# Patient Record
Sex: FEMALE | Race: BLACK OR AFRICAN AMERICAN
[De-identification: names, ages, dates, MRNs, and addresses within clinical notes are randomized per-mention and may not be internally consistent; named-entity substitution may affect disease eponyms.]

---

## 2018-08-05 ENCOUNTER — HOSPITAL ENCOUNTER (INPATIENT)
Dept: HOSPITAL 68 - ERH | Age: 83
LOS: 3 days | DRG: 812 | End: 2018-08-08
Attending: INTERNAL MEDICINE
Payer: COMMERCIAL

## 2018-08-05 VITALS — SYSTOLIC BLOOD PRESSURE: 120 MMHG | DIASTOLIC BLOOD PRESSURE: 60 MMHG

## 2018-08-05 VITALS — BODY MASS INDEX: 20.76 KG/M2 | HEIGHT: 59 IN | WEIGHT: 103 LBS

## 2018-08-05 DIAGNOSIS — E78.5: ICD-10-CM

## 2018-08-05 DIAGNOSIS — M81.0: ICD-10-CM

## 2018-08-05 DIAGNOSIS — R63.4: ICD-10-CM

## 2018-08-05 DIAGNOSIS — K22.4: ICD-10-CM

## 2018-08-05 DIAGNOSIS — R26.89: ICD-10-CM

## 2018-08-05 DIAGNOSIS — I10: ICD-10-CM

## 2018-08-05 DIAGNOSIS — D46.9: Primary | ICD-10-CM

## 2018-08-05 DIAGNOSIS — K21.9: ICD-10-CM

## 2018-08-05 DIAGNOSIS — K64.4: ICD-10-CM

## 2018-08-05 DIAGNOSIS — K58.2: ICD-10-CM

## 2018-08-05 DIAGNOSIS — I87.2: ICD-10-CM

## 2018-08-05 DIAGNOSIS — G62.9: ICD-10-CM

## 2018-08-05 DIAGNOSIS — K64.9: ICD-10-CM

## 2018-08-05 DIAGNOSIS — D63.8: ICD-10-CM

## 2018-08-05 DIAGNOSIS — E55.9: ICD-10-CM

## 2018-08-05 LAB
APTT BLD: 29 SEC (ref 25–37)
ERYTHROCYTE [DISTWIDTH] IN BLOOD BY AUTOMATED COUNT: 25.2 % (ref 11.5–14.5)
HCT VFR BLD CALC: 20.4 % (ref 37–47)
MCH RBC QN AUTO: 35 PG (ref 27–31)
MCHC RBC AUTO-ENTMCNC: 33.5 G/DL (ref 33–37)
MCV RBC AUTO: 104.5 FL (ref 81–99)
PLATELET # BLD: 240 /CUMM (ref 130–400)
PMV BLD AUTO: 9.3 FL (ref 7.4–10.4)
PROTHROMBIN TIME: 13.2 SEC (ref 9.4–12.5)
RED BLOOD CELL CT: 1.95 /CUMM (ref 4.2–5.4)
WBC # BLD AUTO: 2.2 /CUMM (ref 4.8–10.8)

## 2018-08-05 PROCEDURE — 2NASP: CPT

## 2018-08-05 PROCEDURE — P9016 RBC LEUKOCYTES REDUCED: HCPCS

## 2018-08-05 PROCEDURE — 30233N1 TRANSFUSION OF NONAUTOLOGOUS RED BLOOD CELLS INTO PERIPHERAL VEIN, PERCUTANEOUS APPROACH: ICD-10-PCS | Performed by: INTERNAL MEDICINE

## 2018-08-05 NOTE — RADIOLOGY REPORT
EXAMINATION:
PORTABLE CHEST 1 VIEW
 
CLINICAL INFORMATION:
Short of breath.
 
COMPARISON:
07/20/2017.
 
TECHNIQUE:
Portable frontal view of the chest was obtained.
 
FINDINGS:
Lungs are hyperinflated with mild chronic appearing reticular markings again
seen bilaterally. I do not appreciate any superimposed focal infiltrate,
effusion, edema, or pneumothorax. Cardiac silhouette remains prominent but
unchanged. Vascular calcification seen in the aorta. No acute bony
abnormality.
 
IMPRESSION:
Hyperinflated with chronic appearing reticular markings similar to the prior
study.

## 2018-08-05 NOTE — HISTORY & PHYSICAL
Farnaz LEMUS,Vadim 08/05/18 0354:
General Information and HPI
MD Statement:
I have seen and personally examined EVA GARZA and documented this H&P.
 
The patient is a 83 year old F who presented with a patient stated chief 
complaint of [abnormal labs].
 
Source of Information: patient, family, old records
Exam Limitations: language barrier
History of Present Illness:
Patient is an 83-year-old female with past medical history of anemia, 
hyperlipidemia, GERD, constipation, internal hemorrhoids, osteoporosis 
presenting this admission for evaluation of anemia after being sent in by her 
primary care physician.
 
Patient does not speak English and her daughter was present at the time of this 
interview.  Patient's daughter reports that patient has had a history of anemia.
 Reports patient has been feeling more fatigued lately and has been short of 
breath and lightheaded.  Patient denies any chest pain.  Reports numbness and 
tingling in her hands and feet and feeling unsteady on her feet.  States that 
this has been ongoing for the past 3 years however daughter reports that she is 
just learning of this now.  Denies any bright red blood per rectum, melena, 
hematemesis, hematuria, epistaxis.  Denies easy bruising or bleeding.  Patient 
reports poor appetite and she feels that food gets stuck in her throat and per 
daughter has a history of gastritis.  Records reveal that patient had an EGD 
with Dr. Dillon which revealed a normal gastric mucosa and small hiatal hernia.  
Daughter notes that they have been supplementing the patient's diet with ensure 
or boost and she has been taking iron supplements over the past 3 months.  Of 
note patient has a history of internal hemorrhoids and hemorrhoidectomy in 2012.
 Daughter reports the patient has chronic constipation and has been alternating 
between multiple stool softeners.
 
Reports patient has a history of anemia since she was young.  Of note this is 
her first transfusion.  Patient's daughter states she herself also has anemia.  
Reports that she was told it was due to iron deficiency.  Patient patient's 
daughter states that her last colonoscopy was a few years ago and was reportedly
normal.
 
Patient in the ED was type and crossed and consented and given 1 PRBC 
transfusion.
 
 
Allergies/Medications
Allergies:
Coded Allergies:
NO KNOWN ALLERGIES (10/04/12)
  [facility] PER ANTIBIOTIC ORDER SHEET
 
Home Med list
Acetaminophen (Tylenol) (Unknown Strength) TABLET   (Unknown Dose) PO PRN PAIN  
(Reported)
Aspirin (Ecotrin*) 325 MG TABLET.DR   1 TAB PO DAILY HEART/BLOOD  (Reported)
Cholecalciferol (Vitamin D3) (Vitamin D) (Unknown Strength) CAPSULE   (Unknown 
Dose) PO DAILY SUPPLEMENT  (Reported)
Cyanocobalamin (Vitamin B-12) (Unknown Strength) TABLET   (Unknown Dose) PO 
DAILY SUPPLEMENT  (Reported)
Docusate Sodium 100 MG CAPSULE   1 CAP PO DAILY STOOL SOFTENER  (Reported)
Ferrous Sulfate (IRON) 325 MG (65 MG IRON) TABLET   1 TAB PO EOD SUPPLEMENT  (
Reported)
Losartan Potassium 50 MG TABLET   1 TAB PO DAILY BP  (Reported)
Polyethylene Glycol 3350 17 GRAM/DOSE POWDER   17 GM PO PRN GI  (Reported)
Sennosides (Senokot) 8.6 MG TABLET   1 TAB PO DAILY GI  (Reported)
 
 
Past History
 
Travel History
Traveled to [place] past 21 day No
 
Medical History
Neurological: NONE
EENT: NONE
Cardiovascular: hyperlipidemia
Respiratory: NONE
Gastrointestinal: GERD, CONSTIPATION
Hepatic: NONE
Renal: NONE
Musculoskeletal: OSTEOPOROSIS
Psychiatric: NONE
Endocrine: NONE
 
Surgical History
Surgical History: none
 
Review of Systems
 
Review of Systems
Constitutional:
Reports: chills, weakness. 
EENTM:
Reports: no symptoms. 
Cardiovascular:
Reports: no symptoms. 
Respiratory:
Reports: short of breath. 
GI:
Reports: bloating, constipation.  Denies: abdominal pain, diarrhea, melena, 
nausea, bloody stool, vomiting. 
Genitourinary:
Reports: no symptoms. 
Musculoskeletal:
Reports: back pain. 
Skin:
Reports: no symptoms. 
Neurological/Psychological:
Reports: numbness, paresthesia, tingling. 
Hematologic/Endocrine:
Reports: no symptoms. 
Immunologic/Allergic:
Reports: no symptoms. 
 
Exam & Diagnostic Data
Last 24 Hrs of Vital Signs/I&O
 Vital Signs
 
 
Date Time Temp Pulse Resp B/P B/P Pulse O2 O2 Flow FiO2
 
     Mean Ox Delivery Rate 
 
08/05 1817 98.8        
 
08/05 1757 99.0 65 16 125/59  99 Room Air  
 
08/05 1726 97.9 65 16 132/63  98 Room Air  
 
08/05 1404 98.2 75 18 135/67  98 Room Air  
 
 
 Intake & Output
 
 
 08/05 1600 08/05 0800 08/05 0000
 
Intake Total   
 
Output Total   
 
Balance   
 
    
 
Patient 103 lb  
 
Weight   
 
Weight Reported by Patient  
 
Measurement   
 
Method   
 
 
 
 
Physical Exam
General Appearance Alert, Oriented X3, Cooperative, No Acute Distress
Skin No Rashes, No Breakdown, No Significant Lesion
Skin Temp/Moisture Exam: Warm/Dry
Sepsis Skin Exam (color): Normal for Ethnicity
HEENT Atraumatic, PERRLA, EOMI, Mucous Membr. moist/pink, +conjunctival pallor
Cardiovascular Regular Rate, Normal S1, Normal S2
Lungs Clear to Auscultation, Normal Air Movement
Abdomen Normal Bowel Sounds, Soft, No Tenderness
Neurological Normal Speech, Strength at 5/5 X4 Ext, Normal Tone, Sensation 
Intact, Cranial Nerves 3-12 NL
Extremities No Clubbing, No Cyanosis, No Edema, Normal Pulses, No Tenderness/
Swelling
Vascular Normal Pulses, Pulses Symmetrical
Last 24 Hrs of Labs/Amandeep:
 Laboratory Tests
 
08/05/18 1411:
Anion Gap 6, Estimated GFR > 60, BUN/Creatinine Ratio 18.0, Glucose 88, Calcium 
9.3, Total Bilirubin 1.2, AST 25, ALT 30, Alkaline Phosphatase 29, Troponin I < 
0.01, Total Protein 6.8, Albumin 4.2, Globulin 2.6, Albumin/Globulin Ratio 1.6, 
PT 13.2  H, INR 1.21  H, APTT 29, CBC w Diff MAN DIFF ORDERED, RBC 1.95  L, MCV 
104.5  H, MCH 35.0  H, MCHC 33.5, RDW 25.2  H, MPV 9.3, Segmented Neutrophils 46
, Band Neutrophils 2, Lymphocytes 40, Monocytes 7, Eosinophils 3, Basophils 2, 
Platelet Estimate VERIFIED BY SMEAR, Polychromasia 1+, Hypochromic-Microcytic 2+
, Poikilocytosis 1+, Basophilic Stippling 1+, Anisocytosis 2+, Macrocytic Cells 
1+, Ovalocytes 1+
 
 
Assessment/Plan
Assessment:
Patient is an 83-year-old female with past medical history significant for 
anemia and internal hemorrhoids presenting this admission with abnormal lab 
values significant for anemia.  The etiology is unclear at this time however may
be multifactorial and may include iron deficiency anemia secondary to GI 
bleeding, malabsorption, nutritional deficiency.  Patient's labs reveal 
macrocytic anemia which is indicative of possible folic acid or B12 deficiency. 
Of note patient does have some neurological symptoms including paresthesias and 
gait instability.  Further evaluation and treatment is warranted at this time.  
Patient will be admitted to the general medicine floor.
 
Plan:
Admit to general med
Vitals every shift
1 PRBC transfusion
Monitor H&H with posttransfusion CBC today and CBC in a.m.
Guaiac all stools
Iron panel
B12 and folate
Peripheral smear
Consider GI consult in a.m.
Nutrition consult
 
Code: Full code
Diet: Regular 
DVT: Mechanical only in setting of severe anemia 
 
As Ranked By This Provider
Problem List:
 1. Anemia
   Qualifiers
 Anemia type: unspecified type Qualified Code: D64.9 - Anemia, unspecified
 
 
Core Measures/Misc (9/17)
 
Acute Coronary Syndrome
ACS Diagnosis: No
 
Congestive Heart Failure
Congestive Heart Failure Diagnosis No
 
Cerebrovascular Accident
CVA/TIA Diagnosis: No
 
VTE (View Protocol)
VTE Risk Factors Age>40
No Mechanical VTE Prophylaxis d/t N/A MechProphylax Ordered
No VTE Pharm Prophylaxis d/t Other (Severe Anemia)
 
Sepsis (View protocol)
Sepsis Present: No
If YES complete Sepsis Event Note If YES complete Sepsis Event Note
 
 
Forrest LEMUS,[facility] 08/06/18 1023:
Core Measures/Misc (9/17)
 
Sepsis (View protocol)
If YES complete Sepsis Event Note If YES complete Sepsis Event Note
 
Attending MD Review Statement
 
Attending Statement
Attending MD Statement: examined this patient, discuss w/resident/PA/NP, agreed 
w/resident/PA/NP, discussed with family, reviewed EMR data (avail), discussed 
with nursing, discussed with case mgmt, reviewed images, amended to note
Attending Assessment/Plan:
Pt with Esophageal dysmotility Osteoporosis s/p boniva for more than 5 yrs now 
on vit d and calcium and wt bearing activity,  Gastroesophageal reflux disease 
without esophagitis, Hyperlipidemia, unspecified on diet control does not want 
statin, Irritable bowel syndrome with both constipation and diarrhea, Vitamin D 
deficiency, Essential hypertension, Cervical arthritis, Hyperlipidemia, 
unspecified on diet control does not want statin, Irritable bowel syndrome with 
both constipation and diarrhea, Lung nodule less than 5 mm non smoker hence prob
benign pt wishes clinical follow up, Venous insufficiency (chronic) (peripheral)
rt more than left, Hemorrhoids, Primary osteoarthritis of both hips with chronic
anemia now comes with 
 Sig symptomatic anemia
Wt loss chronic with ibs features
Chronic constipation now had no bm for 5 day, hemerroids
Fatigue
Gait imbalance with peripheral neuropathy features with no sig vit def
Previous lung nodule and pelvic gonadal vein congestion  (will consider ct abd)
 
REC
GI eval and Heme eval Please call Dr. Hussein
Rectal exam
Dulcolax supp, miralax, today and if no bm can consider one dose of lactulose
Head ct with recent gait imbalance etc
Ct abd and pelvis with ivc to eval for any malignancy
Will follow

## 2018-08-05 NOTE — ED GENERAL ADULT
History of Present Illness
 
General
Chief Complaint: General Adult
Stated Complaint: SENT BY  FOR ABNORMAL LABS
Source: patient, old records, covering MD
Exam Limitations: no limitations
 
Vital Signs & Intake/Output
Vital Signs & Intake/Output
 Vital Signs
 
 
Date Time Temp Pulse Resp B/P B/P Pulse O2 O2 Flow FiO2
 
     Mean Ox Delivery Rate 
 
08/08 1431 97.5 68 18 150/60  98 Room Air  
 
 
 ED Intake and Output
 
 
 08/09 0000 08/08 1200
 
Intake Total  10
 
Output Total  
 
Balance  10
 
   
 
Intake, IV  10
 
Intake, Oral  0
 
Patient  103 lb
 
Weight  
 
 
 
Allergies
Coded Allergies:
NO KNOWN ALLERGIES (10/04/12)
  [facility] PER ANTIBIOTIC ORDER SHEET
 
Reconcile Medications
Acetaminophen (Tylenol) (Unknown Strength) TABLET   650 PO Q6 PRN PAIN  (
Reported)
Aspirin (Ecotrin*) 325 MG TABLET.DR   1 TAB PO DAILY HEART/BLOOD  (Reported)
Docusate Sodium 100 MG CAPSULE   1 CAP PO DAILY STOOL SOFTENER  (Reported)
Ferrous Sulfate (IRON) 325 MG (65 MG IRON) TABLET   1 TAB PO EOD SUPPLEMENT  (
Reported)
Losartan Potassium 50 MG TABLET   1 TAB PO DAILY BP  (Reported)
Polyethylene Glycol 3350 17 GRAM/DOSE POWDER   17 GM PO PRN GI  (Reported)
Sennosides (Senokot) 8.6 MG TABLET   1 TAB PO DAILY GI  (Reported)
 
Triage Note:
84 YO FEMALE SENT TO ER BY  FOR ABNORMAL LABS,
PT AND DAUGHTER UNSURE OF WHAT LABS WERE ABNORMAL.
STATES HX OF ANEMIA. PT C/O WEAKESS/LIGHTHEADED.
PT DENIES CHETS PAIN. PT +SOB, PER DAUGHTER "THATS
NORMAL FOR HER"
Triage Nurses Notes Reviewed? yes
Onset: Gradual
Duration: day(s):, continues in ED, getting worse, waxing and waning
Severity: severe
HPI:
Patient presents for evaluation of a severe anemia.  Patient states that she has
been suffering from weakness and fatigue recently and blood tests done by her 
physician showed a low blood cell count.  In addition to the weakness patient 
has experienced shortness of breath but this isn't unusual for her.
 
Past History
 
Travel History
Traveled to [place] past 21 day No
 
Medical History
Any Pertinent Medical History? see below for history
Neurological: NONE
EENT: NONE
Cardiovascular: hyperlipidemia
Respiratory: NONE
Gastrointestinal: GERD, CONSTIPATION
Hepatic: NONE
Renal: NONE
Musculoskeletal: OSTEOPOROSIS
Psychiatric: NONE
Endocrine: NONE
 
Surgical History
Surgical History: none
 
Psychosocial History
What is your primary language Klve[place] Navarrete
Tobacco Use: Never used
 
Family History
Hx Contributory? No
 
Review of Systems
 
Review of Systems
Constitutional:
Reports: no symptoms. 
EENTM:
Reports: no symptoms. 
Respiratory:
Reports: no symptoms. 
Cardiovascular:
Reports: no symptoms. 
GI:
Reports: no symptoms. 
Genitourinary:
Reports: no symptoms. 
Musculoskeletal:
Reports: no symptoms. 
Skin:
Reports: no symptoms. 
Neurological/Psychological:
Reports: no symptoms. 
Hematologic/Endocrine:
Reports: no symptoms. 
Immunologic/Allergic:
Reports: no symptoms. 
All Other Systems: Reviewed and Negative
 
Physical Exam
 
Physical Exam
General Appearance: SEE BELOW
Comments:
Gen.: Well-nourished, well-developed, no acute respiratory distress.  Thin.
Head: Normocephalic, atraumatic.
Eyes: Normal inspection bilaterally
Ears: Normal inspection bilaterally
Nose: Normal inspection
Throat/mouth : Moist mucosa 
Neck: Supple, full range of motion, no goiter
Heart: Regular rate and rhythm, no murmurs rubs or gallops
Lungs: Clear to auscultation bilaterally with normal air entry
Chest: Nontender
Back: Normal range of motion
Abdomen: Soft, nontender, nondistended, normal bowel sounds
Extremities: Normal range of motion grossly, equal radial pulses, no cyanosis 
clubbing or edema
Neurologic: Cranial nerves grossly intact, speech is clear
Skin: warm and dry
Psychiatric: Calm, cooperative, no apparent delusions or hallucinations
Rectal examination: Deferred given the patient's recent GI evaluation
 
Core Measures
ACS in differential dx? No
CVA/TIA Diagnosis: No
Sepsis Present: No
Sepsis Focused Exam Completed? No
 
Progress
Differential Diagnoses
I considered the following diagnoses in my evaluation of the patient: GI 
bleeding, dietary deficiency such as iron deficiency or B vitamin deficiency, 
hematologic disease, renal disease
 
Plan of Care:
 Orders
 
 
Procedure Date/time Status
 
Regular Diet 08/08 D Active
 
PT Evaluate & Treat 08/08  UNK Active
 
Discharge Patient 08/08  UNK Active
 
 
 Laboratory Tests
 
 
08/08/18 1246:
Flow Cytometry Specimen Pending
 
08/08/18 1246:
Leukemic Chromosome Anal Pending, Flow Cytometry Specimen Pending
 
Initial ED EKG: none, NSR, rate (70), NO ACUTE st SEGMENT CHANGES
Prior EKG: unchanged
Comments:
Patient's case discussed with Dr. Melendez covering for Dr. Clayton.
 
Departure
 
Departure
Disposition: STILL A PATIENT
Condition: Stable
Clinical Impression
Primary Impression: Anemia
Qualifiers:  Anemia type: unspecified type Qualified Code: D64.9 - Anemia, 
unspecified
Referrals:
Forrest LEMUS,Zak CENTENO (PCP/Family)
 
Departure Forms:
Customer Survey
General Discharge Information
 
Admission Note
Spoke With:
Nora LEMUS,Bud DAO
Documentation of Exam:
Documentation of any treatments & extenuating circumstances including Concerns 
Regarding Discharge (functional status, medication knowledge or non-compliance, 
living conditions, etc.) that warrant an admission rather than observation: 
Patient has an acute anemia of unclear cause.  She now requires a blood 
transfusion to prevent onset of symptoms such as chest pain shortness of breath 
easy fatigability and generalized weakness.  Since the cause of this patient's 
anemia is unclear it is difficult to predict if she will get better or get worse
even after a blood transfusion.  Given her advanced age and associated medical 
comorbidities I feel outpatient treatment would be risky as this could 
exacerbate chest pain or shortness of breath given her decreased oxygen carrying
capacity.  I feel that she would have great difficulty in compliance with 
outpatient treatment and could very well return in worse clinical condition.  
During her hospitalization I feel this patient should be transfused with blood 
and have repeat H&H to assess for response.  GI consultation should be 
considered for the possibility of an occult GI bleed.  Other causes of the 
patient's anemia such as hematologic disease or dietary considerations such as 
iron deficiency should be investigated and corrected.  Given this patient's age 
her treatment and recovery will likely be prolonged.  I feel she will require a 
multiple day hospitalization.
 
 
Critical Care Note
 
Critical Care Note
Critical Care Time: 30-74 min

## 2018-08-06 VITALS — DIASTOLIC BLOOD PRESSURE: 52 MMHG | SYSTOLIC BLOOD PRESSURE: 118 MMHG

## 2018-08-06 VITALS — DIASTOLIC BLOOD PRESSURE: 76 MMHG | SYSTOLIC BLOOD PRESSURE: 126 MMHG

## 2018-08-06 VITALS — SYSTOLIC BLOOD PRESSURE: 128 MMHG | DIASTOLIC BLOOD PRESSURE: 60 MMHG

## 2018-08-06 VITALS — DIASTOLIC BLOOD PRESSURE: 60 MMHG | SYSTOLIC BLOOD PRESSURE: 100 MMHG

## 2018-08-06 LAB
ABSOLUTE GRANULOCYTE CT: 1.2 /CUMM (ref 1.4–6.5)
BASOPHILS # BLD: 0 /CUMM (ref 0–0.2)
BASOPHILS NFR BLD: 0 % (ref 0–2)
EOSINOPHIL # BLD: 0.1 /CUMM (ref 0–0.7)
EOSINOPHIL NFR BLD: 2.4 % (ref 0–5)
ERYTHROCYTE [DISTWIDTH] IN BLOOD BY AUTOMATED COUNT: 27.9 % (ref 11.5–14.5)
GRANULOCYTES NFR BLD: 43.3 % (ref 42.2–75.2)
HCT VFR BLD CALC: 26.4 % (ref 37–47)
LYMPHOCYTES # BLD: 1.2 /CUMM (ref 1.2–3.4)
MCH RBC QN AUTO: 32 PG (ref 27–31)
MCHC RBC AUTO-ENTMCNC: 33.3 G/DL (ref 33–37)
MCV RBC AUTO: 96 FL (ref 81–99)
MONOCYTES # BLD: 0.3 /CUMM (ref 0.1–0.6)
PLATELET # BLD: 190 /CUMM (ref 130–400)
PMV BLD AUTO: 8.4 FL (ref 7.4–10.4)
RED BLOOD CELL CT: 2.75 /CUMM (ref 4.2–5.4)
WBC # BLD AUTO: 2.8 /CUMM (ref 4.8–10.8)

## 2018-08-06 NOTE — CT SCAN REPORT
EXAMINATION:
CT HEAD WITHOUT AND WITH CONTRAST
 
CLINICAL INFORMATION:
Gait imbalance. History of lung mass and weight loss. Assess for mass versus
NPH versus cortical atrophy.
 
COMPARISON:
None.
 
TECHNIQUE:
Contiguous axial imaging was performed from the skull base to vertex before
and after the administration of 95 mL of Optiray 320 intravenous contrast.
 
DLP:
1094.62 mGy-cm
 
FINDINGS:
There is no evidence of acute intracranial hemorrhage or territorial
infarction. No abnormal mass effect or midline shift is seen. Gray to white
matter differentiation is well preserved. No extra-axial fluid collections
are identified. No masses or abnormal enhancement are demonstrated. There
have been bilateral lens extractions..
 
The ventricles and sulci commensurately prominent consistent with moderate
diffuse volume loss. There are areas of low attenuation in the
periventricular and subcortical white matter, consistent with chronic
microvascular ischemic changes. The osseous structures and soft tissues are
normal. The mastoid air cells and visualized portions of the paranasal
sinuses are well aerated.
 
IMPRESSION:
1. There are no acute bleeds or territorial infarcts.
 
2. There are no masses or areas of abnormal enhancement.
 
3. There are chronic microvascular ischemic changes. There is diffuse volume
loss.

## 2018-08-06 NOTE — PN- HOUSESTAFF
Garry Mckeon 18 0812:
Subjective
Follow-up For:
Normocytic normochromic anemia, Lower GI bleed secondary to hemorrhoid
Complaints: no complaints
Subjective:
Patient seen and examined on chair.  There was no overnight event.  No distress.
 She did well overnight.  There was no bowel movement.  There is no history of 
any  blood loss.  He is optimistic to go home today.  She denies weakness 
tiredness, fever chill, chest pain, shortness of breath, abdominal pain, 
diarrhea, loose motion.
 
Review of Systems
Constitutional:
Reports: see HPI. 
 
Objective
Last 24 Hrs of Vital Signs/I&O
 Vital Signs
 
 
Date Time Temp Pulse Resp B/P B/P Pulse O2 O2 Flow FiO2
 
     Mean Ox Delivery Rate 
 
 0717 98.0 58 20 128/60  99 Room Air  
 
 0252 98.1 62 20 118/52  96 Nasal 2.0L 
 
       Cannula  
 
 2201 98.0 63 18 120/60  98 Room Air  
 
/ 1817 98.8        
 
 1757 99.0 65 16 125/59  99 Room Air  
 
 1726 97.9 65 16 132/63  98 Room Air  
 
 1404 98.2 75 18 135/67  98 Room Air  
 
 
 Intake & Output
 
 
  1600 /06 0800 08/ 0000
 
Intake Total  120 600
 
Output Total 1000 300 
 
Balance -1000 -180 600
 
    
 
Intake, Oral  120 600
 
Output, Urine 1000 300 
 
Patient   103 lb
 
Weight   
 
 
 
 
Physical Exam
General Appearance: Oriented X3, Cooperative, No Acute Distress
 
Assessment/Plan
Assessment:
83-year-old lady with past medical history of esophageal dysmotility, 
osteoporosis status post Boniva for more than 5 years, GERD, hyperlipidemia, 
irritable bowel syndrome with both constipation and diarrhea, cervical arthritis
, vitamin D deficiency, essential hypertension, 5 mm lung nodules, external 
hemorrhoid, primary osteoarthritis of both hips, anemia of chronic disease 
presented to the emergency department with complaint of weakness and tiredness. 
On examination she was pallor, [place] and [place] nodules on his both hands 
fingers.  Her labs are significant for low H&H at the time of presentation at 
was 6.8/20.4 up to 2 blood transfusion his Hb is 8.8, decreased WBC count, INR 
is normal, saturation is normal on 2 L of oxygen, B12 and folate level is normal
.vitally stable.
 
Problems list:
-Normocytic normochromic anemia now
-External hemorrhoids
-Peripheral neuropathy irritable bowel syndrome with constipation
-Hemotological malignancy
 
Plan:
 
-She currently has normochromic normocytic anemia.  But patient is taking B12 
and folate and she also having peripheral neuropathy  this reflects she might 
had macrocytic anemia due to B12 or folate deficiency because her MCV is on her 
upper limit of the normal. 
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  We can consider myelodysplastic syndrome and can do 
workup for  after discussing with hematologist.
-This anemia may be due to chronic disease because she having many 
comorbidities.  Her previous colonoscopy 5 year back were normal.  There was no 
focus of bleed.
-GI consult placed
-On on rectal exam there was no blood and there was no stool, there was external
hemorrhoid but there was no focus of active bleed.
-Hematology consultation placed.  Waiting for consultant.
-Head CT ordered
-CT abdomen and pelvis IVC for evaluation of any malignancy ordered
-GI/DVT prophylaxis
-Advance diet
-Patient conseled.
-Case discussed with hematologist he assured that he will see patient tomorrow 
and he think that he would work for myelodysplastic syndrome.
Problem List:
 1. Dizziness
 
 2. Anemia
 
Pain Ratin
Pain Location:
N/A
Pain Goal: Remain pain free
Pain Plan:
N/A
Tomorrow's Labs & Rationales:
LOGAN Clayton MD,[facility] 18 1033:
Attending MD Review Statement
 
Attending Statement
Attending MD Statement: examined this patient, discuss w/resident/PA/NP, agreed 
w/resident/PA/NP, discussed with family, reviewed EMR data (avail), discussed 
with nursing, discussed with case mgmt, reviewed images, amended to note
Attending Assessment/Plan:
Full note per house officer
General Appearance Alert, Oriented X3, Cooperative, No Acute Distress
Skin No Rashes, No Breakdown, No Significant Lesion
Skin Temp/Moisture Exam: Warm/Dry
Sepsis Skin Exam (color): Normal for Ethnicity
HEENT Atraumatic, PERRLA, EOMI, Mucous Membr. moist/pink, +conjunctival pallor
Cardiovascular Regular Rate, Normal S1, Normal S2
Lungs Clear to Auscultation, Normal Air Movement
Abdomen Normal Bowel Sounds, Soft, No Tenderness
Neurological Normal Speech, Strength at 5/5 X4 Ext, Normal Tone, Sensation 
Intact, Cranial Nerves 3-12 NL
Extremities No Clubbing, No Cyanosis, No Edema, Normal Pulses, No Tenderness/
Swelling
Vascular Normal Pulses, Pulses Symmetrical
 
Pt with Esophageal dysmotility Osteoporosis s/p boniva for more than 5 yrs now 
on vit d and calcium and wt bearing activity,  Gastroesophageal reflux disease 
without esophagitis, Hyperlipidemia, unspecified on diet control does not want 
statin, Irritable bowel syndrome with both constipation and diarrhea, Vitamin D 
deficiency, Essential hypertension, Cervical arthritis, Hyperlipidemia, 
unspecified on diet control does not want statin, Irritable bowel syndrome with 
both constipation and diarrhea, Lung nodule less than 5 mm non smoker hence prob
benign pt wishes clinical follow up, Venous insufficiency (chronic) (peripheral)
rt more than left, Hemorrhoids, Primary osteoarthritis of both hips with chronic
anemia now comes with 
 Sig symptomatic anemia
Wt loss chronic with ibs features
Chronic constipation now had no bm for 5 day, hemerroids
Fatigue
Gait imbalance with peripheral neuropathy features with no sig vit def
Previous lung nodule and pelvic gonadal vein congestion  (will consider ct abd)
 
REC
GI eval and Heme eval Please call Dr. Hussein
Rectal exam
Dulcolax supp, miralax, today and if no bm can consider one dose of lactulose
Head ct with recent gait imbalance etc
Ct abd and pelvis with ivc to eval for any malignancy
Will follow

## 2018-08-06 NOTE — CONS- GASTROENTEROLOGY
General Information and HPI
 
Consulting Request
Date of Consult: 08/06/18
Requested By:
Bud Melendez MD
 
Reason for Consult:
Anemia. Constipation. Dysphagia.
Source of Information: patient
Exam Limitations: language barrier
History of Present Illness:
Ms. Ramirez is an 83 year old female with a PMH significant for hyperlipidemia 
and osteoporosis who presented to  yesterday after being sent in by her PCP 
for a transfusion after being noted to have a hgb of about 6.8 on routine blood 
work.  Her daughter is at her bedside who was able to help with her history.  
She is without any GI complatins.  She denies any abdominal pain with eating, 
heartburn or current dysphagia.  She is also without any vomiting or 
hematemesis. She is constipated at baseline and this hasn't changed recently.  
She has been without any brbpr, melena or diarrhea.  In the ER she was 
hemodynamically stable and afebrile.  She was noted to have a macrocytic anemia 
for which she was tranfused with 2 units of PRBCs with approprite correction of 
her hgb and she had a ct scan of her abdomen that was negative for any obvious 
masses or a retroperitoneal hematoma. Since admission she has been 
hemodynamically stable and without overt GI bleeding. 
 
Allergies/Medications
Allergies:
Coded Allergies:
NO KNOWN ALLERGIES (10/04/12)
  [facility] PER ANTIBIOTIC ORDER SHEET
 
Home Med List:
Acetaminophen (Tylenol) (Unknown Strength) TABLET   650 PO Q6 PRN PAIN  (
Reported)
Aspirin (Ecotrin*) 325 MG TABLET.DR   1 TAB PO DAILY HEART/BLOOD  (Reported)
Docusate Sodium 100 MG CAPSULE   1 CAP PO DAILY STOOL SOFTENER  (Reported)
Ferrous Sulfate (IRON) 325 MG (65 MG IRON) TABLET   1 TAB PO EOD SUPPLEMENT  (
Reported)
Losartan Potassium 50 MG TABLET   1 TAB PO DAILY BP  (Reported)
Polyethylene Glycol 3350 17 GRAM/DOSE POWDER   17 GM PO PRN GI  (Reported)
Sennosides (Senokot) 8.6 MG TABLET   1 TAB PO DAILY GI  (Reported)
 
Current Medications:
 Current Medications
 
 
  Sig/Erin Start time  Last
 
Medication Dose Route Stop Time Status Admin
 
Acetaminophen 650 MG Q6P PRN 08/05 1930 AC 
 
  PO   
 
Acetaminophen 700 MG Q6P PRN 08/05 1930 AC 
 
  IV   
 
Bisacodyl 10 MG ONCE ONE 08/06 1215 DC 08/06
 
  CT 08/06 1216  1302
 
Patient Medication  1 ED ONE ONE 08/06 1200 DC 08/06
 
Teaching  ED 08/06 1201  1204
 
Polyethylene Glycol 17 GM DAILY 08/06 1208 AC 
 
  PO   
 
 
 
 
Past History
 
Travel History
Traveled to [place] past 21 day No
 
Medical History
Blood Transfusion Hx: No
Neurological: NONE
EENT: NONE
Cardiovascular: hyperlipidemia
Respiratory: NONE
Gastrointestinal: GERD, CONSTIPATION
Hepatic: NONE
Renal: NONE
Musculoskeletal: OSTEOPOROSIS
Psychiatric: NONE
Endocrine: NONE
Blood Disorders: anemia
Cancer(s): NONE
GYN/Reproductive: NONE
 
Surgical History
Surgical History: 1
 
Psychosocial History
Where Do You Live? Home
Services at Home: None
Smoking Status: Never Smoked
 
Review of Systems
Review of Systems:
Secondary to a lagnuage barrier a full 12 point review of systems was not 
obtained.
 
Exam & Diagnostic Data
Vital Signs and I&O
Vital Signs
 
 
Date Time Temp Pulse Resp B/P B/P Pulse O2 O2 Flow FiO2
 
     Mean Ox Delivery Rate 
 
08/06 0717 98.0 58 20 128/60  99 Room Air  
 
08/06 0252 98.1 62 20 118/52  96 Nasal 2.0L 
 
       Cannula  
 
08/05 2201 98.0 63 18 120/60  98 Room Air  
 
08/05 1817 98.8        
 
08/05 1757 99.0 65 16 125/59  99 Room Air  
 
08/05 1726 97.9 65 16 132/63  98 Room Air  
 
08/05 1404 98.2 75 18 135/67  98 Room Air  
 
 
 Intake & Output
 
 
 08/06 1600 08/06 0400 08/05 1600 08/05 0400 08/04 1600 08/04 0400
 
Intake Total 120 600    
 
Output Total 1300     
 
Balance -1180 600    
 
       
 
Intake, Oral 120 600    
 
Output, Urine 1300     
 
Patient  103 lb 103 lb   
 
Weight      
 
Weight   Reported by Patient   
 
Measurement      
 
Method      
 
 
 
 
Physical Exam
General Appearance: no apparent distress, alert, awake, comfortable, thin
Head: atraumatic, normal appearance
Eyes:
Bilateral: normal appearance. 
Ears, Nose, Throat: normal pharynx, normal ENT inspection
Neck: normal inspection, supple, full range of motion
Respiratory: normal breath sounds, chest non-tender
Cardiovascular: regular rate/rhythm
Gastrointestinal: normal bowel sounds, soft, non-tender, no organomegaly
Rectal: deferred
Back: normal inspection
Extremities: normal inspection, normal capillary refill, normal range of motion
Neurologic/Psych: no motor/sensory deficits, awake, alert, oriented x 3
Skin: intact, normal color, warm/dry
 
Results
Pertinent Lab Results:
 Laboratory Tests
 
 
 08/06 08/06
 
 0600 0325
 
Chemistry  
 
  Sodium (137 - 145 mmol/L) Cancelled 136  L
 
  Potassium (3.5 - 5.1 mmol/L) Cancelled 4.4
 
  Chloride (98 - 107 mmol/L) Cancelled 104
 
  Carbon Dioxide (22 - 30 mmol/L) Cancelled 30
 
  Anion Gap (5 - 16) Cancelled 2  L
 
  BUN (7 - 17 mg/dL) Cancelled 11
 
  Creatinine (0.5 - 1.0 mg/dL) Cancelled 0.5
 
  Estimated GFR (>60 ml/min)  > 60
 
  BUN/Creatinine Ratio (7 - 25 %) Cancelled 22.0
 
Hematology  
 
  CBC w Diff Cancelled MAN DIFF ORDERED
 
  WBC (4.8 - 10.8 /CUMM) Cancelled 2.8  L
 
  RBC (4.20 - 5.40 /CUMM) Cancelled 2.75  L
 
  Hgb (12.0 - 16.0 G/DL) Cancelled 8.8  L
 
  Hct (37 - 47 %) Cancelled 26.4  L
 
  MCV (81.0 - 99.0 FL) Cancelled 96.0
 
  MCH (27.0 - 31.0 PG) Cancelled 32.0  H
 
  MCHC (33.0 - 37.0 G/DL) Cancelled 33.3
 
  RDW (11.5 - 14.5 %) Cancelled 27.9  H
 
  Plt Count (130 - 400 /CUMM) Cancelled 190
 
  MPV (7.4 - 10.4 FL) Cancelled 8.4
 
  Gran % (42.2 - 75.2 %)  43.3
 
  Lymphocytes % (20.5 - 51.1 %)  44.5
 
  Monocytes % (1.7 - 9.3 %)  9.8  H
 
  Eosinophils % (0 - 5 %)  2.4
 
  Basophils % (0.0 - 2.0 %)  0
 
  Absolute Granulocytes (1.4 - 6.5 /CUMM)  1.2  L
 
  Absolute Lymphocytes (1.2 - 3.4 /CUMM)  1.2
 
  Absolute Monocytes (0.10 - 0.60 /CUMM)  0.3
 
  Absolute Eosinophils (0.0 - 0.7 /CUMM)  0.1
 
  Absolute Basophils (0.0 - 0.2 /CUMM)  0
 
  Platelet Estimate (ADEQUATE)  ADEQUATE
 
  Polychromasia  1+
 
  Hypochromic-Microcytic  1+
 
  Anisocytosis  1+
 
 
 
 
 08/06 08/05 08/05
 
 0000 2355 1411
 
Chemistry   
 
  Sodium (137 - 145 mmol/L)   138
 
  Potassium (3.5 - 5.1 mmol/L)   4.1
 
  Chloride (98 - 107 mmol/L)   103
 
  Carbon Dioxide (22 - 30 mmol/L)   29
 
  Anion Gap (5 - 16)   6
 
  BUN (7 - 17 mg/dL)   9
 
  Creatinine (0.5 - 1.0 mg/dL)   0.5
 
  Estimated GFR (>60 ml/min)   > 60
 
  BUN/Creatinine Ratio (7 - 25 %)   18.0
 
  Glucose (65 - 99 mg/dL)   88
 
  Calcium (8.4 - 10.2 mg/dL)   9.3
 
  Iron (37 - 170 ug/dL)   266  H
 
  TIBC (265 - 497 ug/dL)   301
 
  Ferritin (11.1 - 264 ng/mL)   167.0
 
  Total Bilirubin (0.2 - 1.3 mg/dL)   1.2
 
  AST (14 - 36 U/L)   25
 
  ALT (9 - 52 U/L)   30
 
  Alkaline Phosphatase (<127 U/L)   29
 
  Lactate Dehydrogenase (313 - 618 U/L)   563
 
  Troponin I (< 0.11 ng/ml)   < 0.01
 
  Total Protein (6.3 - 8.2 g/dL)   6.8
 
  Albumin (3.5 - 5.0 g/dL)   4.2
 
  Globulin (1.9 - 4.2 gm/dL)   2.6
 
  Albumin/Globulin Ratio (1.1 - 2.2 %)   1.6
 
  Vitamin B12 (239 - 931 pg/mL)   > 1000  H
 
  Folate (2.76 - 20.0 ng/mL)   > 20.0  H
 
Coagulation   
 
  PT (9.4 - 12.5 SEC)   13.2  H
 
  INR (0.90 - 1.19)   1.21  H
 
  APTT (25 - 37 SEC)   29
 
Hematology   
 
  CBC w Diff Cancelled Cancelled MAN DIFF ORDERED
 
  WBC (4.8 - 10.8 /CUMM) Cancelled Cancelled 2.2  L
 
  RBC (4.20 - 5.40 /CUMM) Cancelled Cancelled 1.95  L
 
  Hgb (12.0 - 16.0 G/DL) Cancelled Cancelled 6.8 *L
 
  Hct (37 - 47 %) Cancelled Cancelled 20.4  L
 
  MCV (81.0 - 99.0 FL) Cancelled Cancelled 104.5  H
 
  MCH (27.0 - 31.0 PG) Cancelled Cancelled 35.0  H
 
  MCHC (33.0 - 37.0 G/DL) Cancelled Cancelled 33.5
 
  RDW (11.5 - 14.5 %) Cancelled Cancelled 25.2  H
 
  Plt Count (130 - 400 /CUMM) Cancelled Cancelled 240
 
  MPV (7.4 - 10.4 FL) Cancelled Cancelled 9.3
 
  Segmented Neutrophils (42.2 - 75.2 %)   46
 
  Band Neutrophils (0.0 - 5.0 %)   2
 
  Lymphocytes (20.5 - 51.1 %)   40
 
  Monocytes (1.7 - 9.3 %)   7
 
  Eosinophils (0 - 5.0 %)   3
 
  Basophils (0.0 - 2.0 %)   2
 
  Platelet Estimate (ADEQUATE)   VERIFIED BY SMEAR
 
  Polychromasia   1+
 
  Hypochromic-Microcytic   2+
 
  Poikilocytosis   1+
 
  Basophilic Stippling   1+
 
  Anisocytosis   2+
 
  Macrocytic Cells   1+
 
  Ovalocytes   1+
 
  Retic Count (0.5 - 2.0 %)   1.67
 
Miscellaneous   
 
  Ref Lab Test Result   Cancelled
 
 
 
 
 08/05
 
 1404
 
Hematology 
 
  Haptoglobin Pending
 
 
 
Imaging/Other Studies:
DATE OF SERVICE:  01/22/2013
 
PROCEDURE:  Upper endoscopy with biopsies.
 
: Dr. Reddy Dillon.
 
ASA:  Class II.
 
INDICATIONS:  Dysphagia.
 
MEDICATIONS RECEIVED: As per anesthesiologist.
 
PATIENT TOLERANCE:  Good.
 
COMPLICATIONS:  None.
 
EXTENT REAWCHED:  Second portion of the duodenum.
 
PROCEDURE:  After getting written informed consent the patient was placed in the
left lateral decubitus position with pulse oximetry, cardiac monitoring, and 
supplemental oxygen given.  A bite block was inserted and IV sedation was given 
until the desired effect was achieved.  A high definition upper Olympus 
endoscope was then inserted into the mouth and advanced to the second portion of
the duodenum with little difficulty. Retroflexed views and photo documentation 
were obtained.
 
FINDINGS:
 
Esophagus:  The esophageal mucosa was grossly normal in appearance. There was a 
normal-appearing Z-line at 36 cm from the incisors.  There were no esophageal 
strictures, erosions or masses appreciated.  Random biopsies were obtained from 
the Z-line and from the lower esophageal mucosa with the cold biopsy forceps and
sent to pathology for further evaluation.
 
Stomach:  The gastric mucosa was grossly normal in appearance.  Distension and 
peristalsis of the stomach appeared normal.  There were no ulcers, erosions or 
masses appreciated.  Retroflexed views revealed a small hiatal hernia.  Biopsies
were obtained from the antrum with the cold biopsy forceps and sent to pathology
for further evaluation.
 
Duodenum:  The duodenal bulb, sweep and folds were grossly normal in appearance.
 
IMPRESSION:
Small hiatal hernia.
Normal GE junction and esophageal mucosa, status post random biopsies.  
Normal gastric mucosa, status post biopsies.
 
SERVICE DATE: 08/06/18-
EXAM TYPE: CAT - CT ABD & PELVIS W IV CONTRAST
 
EXAMINATION:
CT ABDOMEN AND PELVIS WITH CONTRAST
 
CLINICAL INFORMATION:
Weight loss. Previous lung nodule.
 
COMPARISON:
Abdomen ultrasound, 12/12/2013. Abdomen and pelvis CT, 10/18/2013
 
TECHNIQUE:
Multidetector volumetric imaging was performed of the abdomen and pelvis
following IV administration of 95 mL of Optiray 320 intravenous contrast.
Sagittal and coronal reformatted images were obtained on the technologist's
workstation.
 
DLP:
253 mGy-cm
 
FINDINGS:
 
LUNG BASES: Cardiomegaly. Trace bilateral pleural effusions and mild
bibasilar atelectasis.
 
LIVER, GALLBLADDER, AND BILIARY TREE: Liver has normal size and contour. No
focal hepatic lesion or intrahepatic bile duct dilatation. Intrahepatic IVC
is dilated and hepatic veins are prominent, likely from elevated right-sided
cardiac pressures. The gallbladder is unremarkable.
 
PANCREAS: Unremarkable.
 
SPLEEN: Unremarkable.
 
ADRENAL GLANDS: Unremarkable.
 
KIDNEYS AND URETERS: The kidneys are normal in size, shape, and attenuation.
Small, 0.5 cm hypodense focus in the medial aspect of the left upper pole is
likely a cyst but is too small for definitive characterization. No suspicious
renal lesion, nephrolithiasis or hydronephrosis.
 
BLADDER: The urinary bladder is well distended and has normal wall thickness.
No bladder calculi.
 
GASTROINTESTINAL TRACT: Stomach is unremarkable. Bowel loops are normal in
caliber. No inflammation or obstruction along the gastrointestinal tract. No
ascites or pneumoperitoneum.
 
ABDOMINAL WALL: Unremarkable.
 
LYMPH NODES: Normal.
 
VASCULAR: Atherosclerosis of the abdominal aorta without aneurysm. Inferior
vena cava and hepatic veins are prominent, likely from elevated right-sided
cardiac pressures. Gonadal veins and parauterine veins are chronically
dilated, consistent with venous valve incompetence.
 
PELVIC VISCERA: The uterus is retroflexed. Prominent myometrial veins are
seen. Trace amount of free fluid in the pelvic cul-de-sac. No pelvic mass.
Multiple phleboliths within the lower pelvis.
 
OSSEOUS STRUCTURES: Mild dextroscoliosis of the lumbar spine. Facet
osteoarthritis of L4-L5 and L5-S1, and vacuum disc degenerative change at
L5-S1. There is 0.4 cm of grade 1 anterolisthesis of L4 on L5. No suspicious
bone lesions.
 
IMPRESSION:
- Cardiomegaly, trace bilateral pleural effusions and engorged inferior vena
cava and hepatic veins (likely from elevated right-sided cardiac pressures.
- No evidence of abdominal mass or lymphadenopathy.
- Chronically dilated gonadal and parauterine veins -- indicative of venous
valve incompetence.
 
 
Assessment/Plan
Assessment/Recommendations:
Assessment: Ms. Ramirez is an 83 year old female admitted with a hgb of 6.8 of 
uncertain etiology, but she is without overt GI bleeding and she also isn't iron
deficient or b12 or folate deficient on lab work.  She also had a ct scan of her
abdomen and pelvis which didn't reveal and bowel wall abnormalities or obvious 
masses within her bowel or stomach which makes a GI source of anemia less likely
when taken in conjunction with the lab work.  She had an endoscopy about 5 years
ago which was done for dysphagia she had at that time which only showed a hiatal
hernia and was otherwise unremarkable and while it may ultimately be necessary 
to repeat this along with repeating a colonoscopy which she apparently hasn't 
had in over 10 years I don't feel that either test needs to be pursued as an 
inpatient considering she is without any overt gi bleeding.  As she is not iron 
deficient though and is currently being evaluated by hematology it may not be 
necessary to repeat at all if her anemia is deemed to be secondary to in 
infiltrative bone marrow process.
 
Recommendations:
1.  Diet as tolerated.
2.  Follow daily CBC and if hgb remains stable would consider discharge in the 
am with plan to pursue a repeat endosocpic work up as an outpatient if the 
hematological work up is negative.
3.  Follow up haptoglobin and hematology recommendations.
4.  Notify GI for signs of overt GI bleeding.
 
As she has no acute GI issues which require inpatient work up I will sign off at
this time and ask that GI be reconsulte with any new GI issues which may arise 
on this hospitalization.
Problem List:
 1. Anemia
 
 2. Dizziness
 
Copies To:
Forrest LEMUS,Zak KEMP.
 
Consult Acknowledgment
- Thank you for your consult request.

## 2018-08-06 NOTE — CT SCAN REPORT
EXAMINATION:
CT ABDOMEN AND PELVIS WITH CONTRAST
 
CLINICAL INFORMATION:
Weight loss. Previous lung nodule.
 
COMPARISON:
Abdomen ultrasound, 12/12/2013. Abdomen and pelvis CT, 10/18/2013
 
TECHNIQUE:
Multidetector volumetric imaging was performed of the abdomen and pelvis
following IV administration of 95 mL of Optiray 320 intravenous contrast.
Sagittal and coronal reformatted images were obtained on the technologist's
workstation.
 
DLP:
253 mGy-cm
 
FINDINGS:
 
LUNG BASES: Cardiomegaly. Trace bilateral pleural effusions and mild
bibasilar atelectasis.
 
LIVER, GALLBLADDER, AND BILIARY TREE: Liver has normal size and contour. No
focal hepatic lesion or intrahepatic bile duct dilatation. Intrahepatic IVC
is dilated and hepatic veins are prominent, likely from elevated right-sided
cardiac pressures. The gallbladder is unremarkable.
 
PANCREAS: Unremarkable.
 
SPLEEN: Unremarkable.
 
ADRENAL GLANDS: Unremarkable.
 
KIDNEYS AND URETERS: The kidneys are normal in size, shape, and attenuation.
Small, 0.5 cm hypodense focus in the medial aspect of the left upper pole is
likely a cyst but is too small for definitive characterization. No suspicious
renal lesion, nephrolithiasis or hydronephrosis.
 
BLADDER: The urinary bladder is well distended and has normal wall thickness.
No bladder calculi.
 
GASTROINTESTINAL TRACT: Stomach is unremarkable. Bowel loops are normal in
caliber. No inflammation or obstruction along the gastrointestinal tract. No
ascites or pneumoperitoneum.
 
ABDOMINAL WALL: Unremarkable.
 
LYMPH NODES: Normal.
 
VASCULAR: Atherosclerosis of the abdominal aorta without aneurysm. Inferior
vena cava and hepatic veins are prominent, likely from elevated right-sided
cardiac pressures. Gonadal veins and parauterine veins are chronically
dilated, consistent with venous valve incompetence.
 
PELVIC VISCERA: The uterus is retroflexed. Prominent myometrial veins are
seen. Trace amount of free fluid in the pelvic cul-de-sac. No pelvic mass.
Multiple phleboliths within the lower pelvis.
 
OSSEOUS STRUCTURES: Mild dextroscoliosis of the lumbar spine. Facet
osteoarthritis of L4-L5 and L5-S1, and vacuum disc degenerative change at
L5-S1. There is 0.4 cm of grade 1 anterolisthesis of L4 on L5. No suspicious
bone lesions.
 
IMPRESSION:
- Cardiomegaly, trace bilateral pleural effusions and engorged inferior vena
cava and hepatic veins (likely from elevated right-sided cardiac pressures.
- No evidence of abdominal mass or lymphadenopathy.
- Chronically dilated gonadal and parauterine veins -- indicative of venous
valve incompetence.

## 2018-08-07 VITALS — SYSTOLIC BLOOD PRESSURE: 132 MMHG | DIASTOLIC BLOOD PRESSURE: 70 MMHG

## 2018-08-07 VITALS — SYSTOLIC BLOOD PRESSURE: 120 MMHG | DIASTOLIC BLOOD PRESSURE: 62 MMHG

## 2018-08-07 VITALS — DIASTOLIC BLOOD PRESSURE: 68 MMHG | SYSTOLIC BLOOD PRESSURE: 146 MMHG

## 2018-08-07 LAB
ABSOLUTE GRANULOCYTE CT: 2 /CUMM (ref 1.4–6.5)
BASOPHILS # BLD: 0 /CUMM (ref 0–0.2)
BASOPHILS NFR BLD: 0 % (ref 0–2)
EOSINOPHIL # BLD: 0.1 /CUMM (ref 0–0.7)
EOSINOPHIL NFR BLD: 3.2 % (ref 0–5)
ERYTHROCYTE [DISTWIDTH] IN BLOOD BY AUTOMATED COUNT: 26.7 % (ref 11.5–14.5)
GRANULOCYTES NFR BLD: 61.2 % (ref 42.2–75.2)
HCT VFR BLD CALC: 31.5 % (ref 37–47)
LYMPHOCYTES # BLD: 0.8 /CUMM (ref 1.2–3.4)
MCH RBC QN AUTO: 32.3 PG (ref 27–31)
MCHC RBC AUTO-ENTMCNC: 33.5 G/DL (ref 33–37)
MCV RBC AUTO: 96.3 FL (ref 81–99)
MONOCYTES # BLD: 0.3 /CUMM (ref 0.1–0.6)
PLATELET # BLD: 220 /CUMM (ref 130–400)
PMV BLD AUTO: 8.5 FL (ref 7.4–10.4)
RED BLOOD CELL CT: 3.27 /CUMM (ref 4.2–5.4)
WBC # BLD AUTO: 3.2 /CUMM (ref 4.8–10.8)

## 2018-08-07 NOTE — PN- HOUSESTAFF
Subjective
Follow-up For:
Normocytic anemia, bicytopenias, weakness.
Complaints: Weakness and somtime feel dizzy. No bowel movements since two days
Subjective:
Patient seen and examined at bed.  He is comfortable with no acute distress.  
There was no overnight event.  He did well last night.  She complaining of 
constipation, feel a little bit dizzy.  This may be due to low H&H.  She denies 
headache, history of fall, chest pain, palpitation, cough, diarrhea, burning 
micturition.
 
Review of Systems
Constitutional:
Reports: see HPI. 
 
Objective
Last 24 Hrs of Vital Signs/I&O
 Vital Signs
 
 
Date Time Temp Pulse Resp B/P B/P Pulse O2 O2 Flow FiO2
 
     Mean Ox Delivery Rate 
 
 0620 98.0 83 18 146/68  98 Room Air  
 
 0000       Room Air  
 
 2145 98.3 50 16 126/76  97 Room Air  
 
 1359 99.7 62 20 100/60  100 Room Air  
 
 
 Intake & Output
 
 
  0800 / 0000  1600
 
Intake Total 360 600 560
 
Output Total   1200
 
Balance 360 600 -640
 
    
 
Intake, IV   10
 
Intake, Oral 360 600 550
 
Number   0
 
Bowel   
 
Movements   
 
Output, Urine   1200
 
 
 
 
Physical Exam
General Appearance: Alert, Oriented X3, Cooperative, No Acute Distress
Cardiovascular: Normal S1, Normal S2
Lungs: Clear to Auscultation, Normal Air Movement
 
Assessment/Plan
Assessment:
Assessment/Plan/Problems list;
 
83-year-old lady with past medical history of esophageal dysmotility, 
osteoporosis status post [place] for more than 5 years, GERD, hyperlipidemia, 
irritable bowel syndrome with both constipation and diarrhea, cervical arthritis
, vitamin D deficiency, essential hypertension, 5 mm lung nodules, external 
hemorrhoid, primary osteoarthritis of both hips, anemia of chronic disease 
presented to the emergency department with complaint of weakness and tiredness. 
On examination she was pallor, [place] and [place] nodules on his both hands 
fingers.  Her labs are significant for low H&H at the time of presentation at 
was 6.8/20.4 up to 2 blood transfusion his Hb is 8.8, decreased WBC count, INR 
is normal, saturation is normal on 2 L of oxygen, B12 and folate level is normal
.vitally stable.
 
Problems list:
-Anemia/bicytopenia
-Decreased reticulocyte count
-External hemorrhoids
-Peripheral neuropathy irritable,
-bowel syndrome with constipation
-Hemotological malignancy?  Workup is awaited.
 
Plan:
 
-She currently has normochromic anemia.  But patient is taking B12 and folate 
and she also having peripheral neuropathy.
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  We can consider one of the differential 
myelodysplastic syndrome beacuase her her leukopenia is since long.
-Hematologist recommended bone marrow biopsy, flow cytometry for 5Q mutation,
-Patient will be NPO from midnight for anticipated bone marrow biopsy tomorrow.
-This anemia may be due to chronic disease because she having many 
comorbidities.  Her previous colonoscopy 5 year back were normal.  There was no 
focus of bleed.
-GI consult placed
-On on rectal exam there was no blood and there was no stool, there was external
hemorrhoid but there was no focus of active bleed.
-CT abdomen and pelvis IVC for evaluation of any malignancy was ordered was 
negative for any mass, or any another abnormality which can lead to bicytopenias
-GI/DVT prophylaxis
-Advance diet
-Patient conseled.
-
Problem List:
 1. Anemia
 
 2. Cytopenia
 
 3. Symptomatic anemia
 
Pain Ratin
Pain Location:
n/a
Pain Goal: Remain pain free
Pain Plan:
n/a
Tomorrow's Labs & Rationales:
n/a

## 2018-08-07 NOTE — PN- PULMONARY
Subjective
HPI/Critical Care Issues:
Patient seen and examined at bed.  He is comfortable with no acute distress.  
There was no overnight event.  He did well last night.  She complaining of 
constipation, feel a little bit dizzy.  This may be due to low H&H.  She denies 
headache, history of fall, chest pain, palpitation, cough, diarrhea, burning 
micturition.
 
Objective
Current Medications:
 Current Medications
 
 
  Sig/Erin Start time  Last
 
Medication Dose Route Stop Time Status Admin
 
Acetaminophen 650 MG Q6P PRN 08/05 1930 AC 
 
  PO   
 
Acetaminophen 700 MG Q6P PRN 08/05 1930 AC 
 
  IV   
 
Bisacodyl 10 MG ONCE ONE 08/06 1215 DC 08/06
 
  CA 08/06 1216  1302
 
Patient Medication  1 ED ONE ONE 08/06 1200 DC 08/06
 
Teaching  ED 08/06 1201  1204
 
Polyethylene Glycol 17 GM DAILY 08/06 1208 AC 08/07
 
  PO   0830
 
 
 
 
Vital Signs & I&O
Last 24 Hrs of Vitals and I&O:
 Vital Signs
 
 
Date Time Temp Pulse Resp B/P B/P Pulse O2 O2 Flow FiO2
 
     Mean Ox Delivery Rate 
 
08/07 0620 98.0 83 18 146/68  98 Room Air  
 
08/07 0000       Room Air  
 
08/06 2145 98.3 50 16 126/76  97 Room Air  
 
08/06 1359 99.7 62 20 100/60  100 Room Air  
 
 
 Intake & Output
 
 
 08/07 1600 08/07 0800 08/07 0000
 
Intake Total  360 600
 
Output Total   
 
Balance  360 600
 
    
 
Intake, Oral  360 600
 
 
 
 
Impression/Plan
 
Impression/Plan
Impression/Plan:
General Appearance Alert, Oriented X3, Cooperative, No Acute Distress
Skin No Rashes, No Breakdown, No Significant Lesion
Skin Temp/Moisture Exam: Warm/Dry
Sepsis Skin Exam (color): Normal for Ethnicity
HEENT Atraumatic, PERRLA, EOMI, Mucous Membr. moist/pink, +conjunctival pallor
Cardiovascular Regular Rate, Normal S1, Normal S2
Lungs Clear to Auscultation, Normal Air Movement
Abdomen Normal Bowel Sounds, Soft, No Tenderness
Neurological Normal Speech, Strength at 5/5 X4 Ext, Normal Tone, Sensation 
Intact, Cranial Nerves 3-12 NL
Extremities No Clubbing, No Cyanosis, No Edema, Normal Pulses, No Tenderness/
Swelling
Vascular Normal Pulses, Pulses Symmetrical
 
Pt with Esophageal dysmotility Osteoporosis s/p boniva for more than 5 yrs now 
on vit d and calcium and wt bearing activity,  Gastroesophageal reflux disease 
without esophagitis, Hyperlipidemia, unspecified on diet control does not want 
statin, Irritable bowel syndrome with both constipation and diarrhea, Vitamin D 
deficiency, Essential hypertension, Cervical arthritis, Hyperlipidemia, 
unspecified on diet control does not want statin, Irritable bowel syndrome with 
both constipation and diarrhea, Lung nodule less than 5 mm non smoker hence prob
benign pt wishes clinical follow up, Venous insufficiency (chronic) (peripheral)
rt more than left, Hemorrhoids, Primary osteoarthritis of both hips with chronic
anemia now comes with 
Sig symptomatic anemia prob due to Bone marrow dysfunction r/o mds
Wt loss chronic with ibs features
Chronic constipation now had no bm for 6 day, hemerroids
Fatigue
Gait imbalance with peripheral neuropathy features with no sig vit def
Previous lung nodule and pelvic gonadal vein congestion, Ct abd and pelvis nil 
acute
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
 
REC
GI eval and Heme eval appretiated
Agg rx of constipation, use dulcolax supp and lactulose etc
Check echo
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
If pt has bm and if stable ok to dc soon
PT eval

## 2018-08-07 NOTE — CONS- HEMATOLOGY
General Information and HPI
 
Consulting Request
Date of Consult: 08/07/18
Requested By:
Forrest LEMUS,Zak CENTENO
 
Reason for Consult:
anemia, leukopenia
Source of Information: patient, family, old records
Exam Limitations: language barrier
History of Present Illness:
Ms. Ramirez is an 83-year-old Salvadorean speaking female with osteoporosis, GERD, 
and anemia who presented to the hospital with anemia.  She was seen by her 
primary care physician and was noted to have severe anemia and was sent to the 
emergency department.  The patient does not speak English and her daughter acts 
as the .  She has been feeling more fatigue and tired recently.  She 
has some shortness of breath and dyspnea.  She has some unsteadiness.  She does 
have some presyncopal episodes.  She denies any chest pain.  She does not have 
any significant shortness of breath.  She denies any blood in stool or urine.  
She denies any bruising or bleeding.  She does not have any epistaxis or 
hemoptysis.  She does have history of internal hemorrhoids and had 
hemorrhoidectomy in 2012. she did previously have endoscopy with Dr. Dillon which
demonstrated gastritis.  She does have history of constipation.
 
On presentation, her blood work demonstrated WBC of 2200, hemoglobin of 6.8, 
hematocrit of 20.4, MCV of 104.5, and platelet count of 756293. She was 
normotensive and hemodynamically stable.  Her kidney function and liver function
are within normal limits. Total bilirubin was normal at 1.2. Her serum iron was 
266 with a TIBC of 301.  Ferritin was 167.  Her vitamin B12 was greater than 
1000.  Ferritin was greater than 20. Her reticulocyte count was 1.67%.  She was 
given 2 units of packed RBC with improvement in her hemoglobin to 8.8 with 
hematocrit of 26.4.  She underwent a CT scan of the abdomen pelvis without any 
significant findings.
 
This morning she feels about the same.  She denies any new symptoms.  The blood 
transfusion did make her feel slightly better.
 
Allergies/Medications
Allergies:
Coded Allergies:
NO KNOWN ALLERGIES (10/04/12)
  [facility] PER ANTIBIOTIC ORDER SHEET
 
Home Med List:
Acetaminophen (Tylenol) (Unknown Strength) TABLET   (Unknown Dose) PO PRN PAIN  
(Reported)
Aspirin (Ecotrin*) 325 MG TABLET.DR   1 TAB PO DAILY HEART/BLOOD  (Reported)
Cholecalciferol (Vitamin D3) (Vitamin D) (Unknown Strength) CAPSULE   (Unknown 
Dose) PO DAILY SUPPLEMENT  (Reported)
Cyanocobalamin (Vitamin B-12) (Unknown Strength) TABLET   (Unknown Dose) PO 
DAILY SUPPLEMENT  (Reported)
Docusate Sodium 100 MG CAPSULE   1 CAP PO DAILY STOOL SOFTENER  (Reported)
Ferrous Sulfate (IRON) 325 MG (65 MG IRON) TABLET   1 TAB PO EOD SUPPLEMENT  (
Reported)
Losartan Potassium 50 MG TABLET   1 TAB PO DAILY BP  (Reported)
Polyethylene Glycol 3350 17 GRAM/DOSE POWDER   17 GM PO PRN GI  (Reported)
Sennosides (Senokot) 8.6 MG TABLET   1 TAB PO DAILY GI  (Reported)
 
Current Medications:
 Current Medications
 
 
  Sig/Erin Start time  Last
 
Medication Dose Route Stop Time Status Admin
 
Acetaminophen 650 MG Q6P PRN 08/05 1930 AC 
 
  PO   
 
Acetaminophen 700 MG Q6P PRN 08/05 1930 AC 
 
  IV   
 
Bisacodyl 10 MG ONCE ONE 08/06 1215 DC 08/06
 
  LA 08/06 1216  1302
 
Patient Medication  1 ED ONE ONE 08/06 1200 DC 08/06
 
Teaching  ED 08/06 1201  1204
 
Polyethylene Glycol 17 GM DAILY 08/06 1208 AC 08/06
 
  PO   1601
 
 
 
 
Review of Systems
 
Review of Systems
Constitutional:
Reports: weakness.  Denies: chills, fever, unexplained weight loss. 
EENTM:
Denies: blurred vision, double vision. 
Cardiovascular:
Reports: syncope.  Denies: chest pain. 
Respiratory:
Denies: short of breath. 
GI:
Reports: constipation.  Denies: melena, nausea, bloody stool, vomiting. 
Musculoskeletal:
Denies: back pain. 
Neurological/Psychological:
Denies: anxiety, dementia. 
Hematologic/Endocrine:
Denies: bruising, bleeding. 
Immunologic/Allergic:
Denies: lymphadenopathy. 
All Other Systems: Reviewed and Negative
 
Past History
 
Travel History
Traveled to [place] past 21 day No
 
Medical History
Blood Transfusion Hx: No
Neurological: NONE
EENT: NONE
Cardiovascular: hyperlipidemia
Respiratory: NONE
Gastrointestinal: GERD, CONSTIPATION
Hepatic: NONE
Renal: NONE
Musculoskeletal: OSTEOPOROSIS
Psychiatric: NONE
Endocrine: NONE
Blood Disorders: anemia
Cancer(s): NONE
GYN/Reproductive: NONE
 
Surgical History
Surgical History: 1
 
Psychosocial History
Where Do You Live? Home
Services at Home: None
Smoking Status: Never Smoked
 
Exam & Diagnostic Data
Vital Signs and I&O
Vital Signs
 
 
Date Time Temp Pulse Resp B/P B/P Pulse O2 O2 Flow FiO2
 
     Mean Ox Delivery Rate 
 
08/07 0620 98.0 83 18 146/68  98 Room Air  
 
08/07 0000       Room Air  
 
08/06 2145 98.3 50 16 126/76  97 Room Air  
 
08/06 1359 99.7 62 20 100/60  100 Room Air  
 
 
 Intake & Output
 
 
 08/07 0800 08/07 0000 08/06 1600
 
Intake Total 360 600 560
 
Output Total   1200
 
Balance 360 600 -640
 
    
 
Intake, IV   10
 
Intake, Oral 360 600 550
 
Number   0
 
Bowel   
 
Movements   
 
Output, Urine   1200
 
 
 
 
Physical Exam
General Appearance: well developed/nourished, no apparent distress, alert, awake
, comfortable, thin
Head: atraumatic, normal appearance
Eyes:
Bilateral: PERRL, EOMI. 
Ears, Nose, Throat: normal pharynx
Neck: supple
Respiratory: normal breath sounds, chest non-tender, no respiratory distress, 
quiet respiration
Cardiovascular: regular rate/rhythm
Gastrointestinal: normal bowel sounds, soft, non-tender, no organomegaly
Extremities: no edema
Neurologic/Psych: awake, alert, oriented x 3
Skin: intact, normal color, warm/dry
Lymphatic: no anterior cervical melida
Last 48 Hours of Lab Results:
 Laboratory Tests
 
 
 08/06 08/06
 
 0600 0325
 
Chemistry  
 
  Sodium (137 - 145 mmol/L) Cancelled 136  L
 
  Potassium (3.5 - 5.1 mmol/L) Cancelled 4.4
 
  Chloride (98 - 107 mmol/L) Cancelled 104
 
  Carbon Dioxide (22 - 30 mmol/L) Cancelled 30
 
  Anion Gap (5 - 16) Cancelled 2  L
 
  BUN (7 - 17 mg/dL) Cancelled 11
 
  Creatinine (0.5 - 1.0 mg/dL) Cancelled 0.5
 
  Estimated GFR (>60 ml/min)  > 60
 
  BUN/Creatinine Ratio (7 - 25 %) Cancelled 22.0
 
Hematology  
 
  CBC w Diff Cancelled MAN DIFF ORDERED
 
  WBC (4.8 - 10.8 /CUMM) Cancelled 2.8  L
 
  RBC (4.20 - 5.40 /CUMM) Cancelled 2.75  L
 
  Hgb (12.0 - 16.0 G/DL) Cancelled 8.8  L
 
  Hct (37 - 47 %) Cancelled 26.4  L
 
  MCV (81.0 - 99.0 FL) Cancelled 96.0
 
  MCH (27.0 - 31.0 PG) Cancelled 32.0  H
 
  MCHC (33.0 - 37.0 G/DL) Cancelled 33.3
 
  RDW (11.5 - 14.5 %) Cancelled 27.9  H
 
  Plt Count (130 - 400 /CUMM) Cancelled 190
 
  MPV (7.4 - 10.4 FL) Cancelled 8.4
 
  Gran % (42.2 - 75.2 %)  43.3
 
  Lymphocytes % (20.5 - 51.1 %)  44.5
 
  Monocytes % (1.7 - 9.3 %)  9.8  H
 
  Eosinophils % (0 - 5 %)  2.4
 
  Basophils % (0.0 - 2.0 %)  0
 
  Absolute Granulocytes (1.4 - 6.5 /CUMM)  1.2  L
 
  Absolute Lymphocytes (1.2 - 3.4 /CUMM)  1.2
 
  Absolute Monocytes (0.10 - 0.60 /CUMM)  0.3
 
  Absolute Eosinophils (0.0 - 0.7 /CUMM)  0.1
 
  Absolute Basophils (0.0 - 0.2 /CUMM)  0
 
  Platelet Estimate (ADEQUATE)  ADEQUATE
 
  Polychromasia  1+
 
  Hypochromic-Microcytic  1+
 
  Anisocytosis  1+
 
 
 
 
 08/06 08/05 08/05
 
 0000 2355 2107
 
Hematology   
 
  CBC w Diff Cancelled Cancelled 
 
  WBC Cancelled Cancelled 
 
  RBC Cancelled Cancelled 
 
  Hgb Cancelled Cancelled 
 
  Hct Cancelled Cancelled 
 
  MCV Cancelled Cancelled 
 
  MCH Cancelled Cancelled 
 
  MCHC Cancelled Cancelled 
 
  RDW Cancelled Cancelled 
 
  Plt Count Cancelled Cancelled 
 
  MPV Cancelled Cancelled 
 
Urines   
 
  Urine Color   Cancelled
 
  Urine Clarity   Cancelled
 
  Urine pH   Cancelled
 
  Ur Specific Gravity   Cancelled
 
  Urine Protein   Cancelled
 
  Urine Ketones   Cancelled
 
  Urine Nitrite   Cancelled
 
  Urine Bilirubin   Cancelled
 
  Urine Urobilinogen   Cancelled
 
  Ur Leukocyte Esterase   Cancelled
 
  Ur Microscopic   Cancelled
 
  Urine Hemoglobin   Cancelled
 
  Urine Glucose   Cancelled
 
 
 
 
 08/05 08/05
 
 1411 1404
 
Chemistry  
 
  Sodium (137 - 145 mmol/L) 138 
 
  Potassium (3.5 - 5.1 mmol/L) 4.1 
 
  Chloride (98 - 107 mmol/L) 103 
 
  Carbon Dioxide (22 - 30 mmol/L) 29 
 
  Anion Gap (5 - 16) 6 
 
  BUN (7 - 17 mg/dL) 9 
 
  Creatinine (0.5 - 1.0 mg/dL) 0.5 
 
  Estimated GFR (>60 ml/min) > 60 
 
  BUN/Creatinine Ratio (7 - 25 %) 18.0 
 
  Glucose (65 - 99 mg/dL) 88 
 
  Calcium (8.4 - 10.2 mg/dL) 9.3 
 
  Iron (37 - 170 ug/dL) 266  H 
 
  TIBC (265 - 497 ug/dL) 301 
 
  Ferritin (11.1 - 264 ng/mL) 167.0 
 
  Total Bilirubin (0.2 - 1.3 mg/dL) 1.2 
 
  AST (14 - 36 U/L) 25 
 
  ALT (9 - 52 U/L) 30 
 
  Alkaline Phosphatase (<127 U/L) 29 
 
  Lactate Dehydrogenase (313 - 618 U/L) 563 
 
  Troponin I (< 0.11 ng/ml) < 0.01 
 
  Total Protein (6.3 - 8.2 g/dL) 6.8 
 
  Albumin (3.5 - 5.0 g/dL) 4.2 
 
  Globulin (1.9 - 4.2 gm/dL) 2.6 
 
  Albumin/Globulin Ratio (1.1 - 2.2 %) 1.6 
 
  Vitamin B12 (239 - 931 pg/mL) > 1000  H 
 
  Folate (2.76 - 20.0 ng/mL) > 20.0  H 
 
Coagulation  
 
  PT (9.4 - 12.5 SEC) 13.2  H 
 
  INR (0.90 - 1.19) 1.21  H 
 
  APTT (25 - 37 SEC) 29 
 
Hematology  
 
  CBC w Diff MAN DIFF ORDERED 
 
  WBC (4.8 - 10.8 /CUMM) 2.2  L 
 
  RBC (4.20 - 5.40 /CUMM) 1.95  L 
 
  Hgb (12.0 - 16.0 G/DL) 6.8 *L 
 
  Hct (37 - 47 %) 20.4  L 
 
  MCV (81.0 - 99.0 FL) 104.5  H 
 
  MCH (27.0 - 31.0 PG) 35.0  H 
 
  MCHC (33.0 - 37.0 G/DL) 33.5 
 
  RDW (11.5 - 14.5 %) 25.2  H 
 
  Plt Count (130 - 400 /CUMM) 240 
 
  MPV (7.4 - 10.4 FL) 9.3 
 
  Segmented Neutrophils (42.2 - 75.2 %) 46 
 
  Band Neutrophils (0.0 - 5.0 %) 2 
 
  Lymphocytes (20.5 - 51.1 %) 40 
 
  Monocytes (1.7 - 9.3 %) 7 
 
  Eosinophils (0 - 5.0 %) 3 
 
  Basophils (0.0 - 2.0 %) 2 
 
  Platelet Estimate (ADEQUATE) VERIFIED BY SMEAR 
 
  Polychromasia 1+ 
 
  Hypochromic-Microcytic 2+ 
 
  Poikilocytosis 1+ 
 
  Basophilic Stippling 1+ 
 
  Anisocytosis 2+ 
 
  Macrocytic Cells 1+ 
 
  Ovalocytes 1+ 
 
  Retic Count (0.5 - 2.0 %) 1.67 
 
  Haptoglobin  Pending
 
Miscellaneous  
 
  Ref Lab Test Result Cancelled 
 
 
 
Imaging/Other Studies:
CT ABDOMEN/PELVIS WITH CONTRAST 8/6/2018:
LUNG BASES: Cardiomegaly. Trace bilateral pleural effusions and mild bibasilar 
atelectasis. 
LIVER, GALLBLADDER, AND BILIARY TREE: Liver has normal size and contour. No 
focal hepatic lesion or intrahepatic bile duct dilatation. Intrahepatic IVC is 
dilated and hepatic veins are prominent, likely from elevated right-sided 
cardiac pressures. The gallbladder is unremarkable. 
PANCREAS: Unremarkable. 
SPLEEN: Unremarkable. 
ADRENAL GLANDS: Unremarkable. 
KIDNEYS AND URETERS: The kidneys are normal in size, shape, and attenuation. 
Small, 0.5 cm hypodense focus in the medial aspect of the left upper pole is 
likely a cyst but is too small for definitive characterization. No suspicious 
renal lesion, nephrolithiasis or hydronephrosis. 
BLADDER: The urinary bladder is well distended and has normal wall thickness. No
bladder calculi. 
GASTROINTESTINAL TRACT: Stomach is unremarkable. Bowel loops are normal in 
caliber. No inflammation or obstruction along the gastrointestinal tract. No 
ascites or pneumoperitoneum. 
ABDOMINAL WALL: Unremarkable. 
LYMPH NODES: Normal. 
VASCULAR: Atherosclerosis of the abdominal aorta without aneurysm. Inferior vena
cava and hepatic veins are prominent, likely from elevated right-sided cardiac 
pressures. Gonadal veins and parauterine veins are chronically dilated, 
consistent with venous valve incompetence.  
PELVIC VISCERA: The uterus is retroflexed. Prominent myometrial veins are seen. 
Trace amount of free fluid in the pelvic cul-de-sac. No pelvic mass. Multiple 
phleboliths within the lower pelvis. 
OSSEOUS STRUCTURES: Mild dextroscoliosis of the lumbar spine. Facet 
osteoarthritis of L4-L5 and L5-S1, and vacuum disc degenerative change at L5-S1.
There is 0.4 cm of grade 1 anterolisthesis of L4 on L5. No suspicious bone 
lesions.
 
IMPRESSION:
- Cardiomegaly, trace bilateral pleural effusions and engorged inferior vena 
cava and hepatic veins (likely from elevated right-sided cardiac pressures.
- No evidence of abdominal mass or lymphadenopathy.
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
incompetence.
 
Assessment/Plan
Assessment:
Ms. Ramirez is an 83-year-old Salvad[place] speaking female with osteoporosis, GERD, 
and anemia who presented to the hospital with anemia.  She was seen by her 
primary care physician and was noted to have severe anemia and was sent to the 
emergency department.  The patient does not speak English and her daughter acts 
as the .
 
On presentation, she had no obvious gastrointestinal bleeding.  Her blood work 
demonstrated leukopenia with a WBC of 2200 and severe anemia with hemoglobin of 
6.8 and hematocrit of 20.4.  Iron study was normal.  Vitamin B12 and folate 
level were normal.  She is on B12 and folate supplementsHer reticulocyte count 
is normal but low relative to her anemia.  She has no kidney dysfunction.  She 
has no liver dysfunction.  Bilirubin was normal.  She did improve with 2 units 
of packed RBC.  Peripheral blood smear demonstrated multiple abnormalities in 
the RBC morphology.
 
On review of her  past medical records and blood work, she has intermittent 
leukopenia since at least 2011. She has anemia since at least 2010. given her 
chronic anemia and leukopenia, it is concerning for potential underlying marrow 
infiltrative process. Given her agent presentation, she likely has underlying 
MDS.  She has no obvious evidence of plasma cell neoplasm. She has no evidence 
of lymphoma.  To further workup, she may undergo flow cytometry of her 
peripheral blood and bone marrow biopsy.  If she has not been checked, HIV and 
hepatitis should be  evaluated.
Recommendations:
Cytopenia:
-flow cytometry
-bone marrow biopsy
-if not checked recently, hepatitis and HIV
-transfused with packed RBC if hemoglobin less than 8
-follow up as outpatient
Problem List:
 1. Cytopenia
 
 2. Symptomatic anemia
 
Other Findings/Comments:
Please call [phone] with any questions or concerns.
 
Consult Acknowledgment
- Thank you for your consult request.

## 2018-08-08 VITALS — DIASTOLIC BLOOD PRESSURE: 68 MMHG | SYSTOLIC BLOOD PRESSURE: 146 MMHG

## 2018-08-08 VITALS — DIASTOLIC BLOOD PRESSURE: 60 MMHG | SYSTOLIC BLOOD PRESSURE: 150 MMHG

## 2018-08-08 PROCEDURE — 07DR3ZX EXTRACTION OF ILIAC BONE MARROW, PERCUTANEOUS APPROACH, DIAGNOSTIC: ICD-10-PCS | Performed by: RADIOLOGY

## 2018-08-08 NOTE — PN- PULMONARY
Subjective
HPI/Critical Care Issues:
Patient seen and examined at bedside.  He was in no acute distress.  There was 
no events overnight. For bone marrow bx today
 
 
Objective
Current Medications:
 Current Medications
 
 
  Sig/Erin Start time  Last
 
Medication Dose Route Stop Time Status Admin
 
Acetaminophen 650 MG Q6P PRN 08/05 1930 AC 
 
  PO   
 
Acetaminophen 700 MG Q6P PRN 08/05 1930 AC 
 
  IV   
 
Bisacodyl 10 MG ONCE ONE 08/08 1000 DC 
 
  IN 08/08 1001  
 
Bupivacaine HCl 0 .STK-MED ONE 08/08 1222 DC 
 
  .ROUTE   
 
Fentanyl Citrate 0 .STK-MED ONE 08/08 1135 DC 
 
  .ROUTE   
 
Midazolam HCl 0 .STK-MED ONE 08/08 1135 DC 
 
  .ROUTE   
 
Polyethylene Glycol 17 GM DAILY 08/06 1208 AC 08/07
 
  PO   0830
 
 
 
 
Vital Signs & I&O
Last 24 Hrs of Vitals and I&O:
 Vital Signs
 
 
Date Time Temp Pulse Resp B/P B/P Pulse O2 O2 Flow FiO2
 
     Mean Ox Delivery Rate 
 
08/08 0620 98.1 59 16 146/68  96 Room Air  
 
08/07 2217 98.4 61 18 120/62  97   
 
08/07 1400 98.3 69 20 132/70  98 Room Air  
 
 
 Intake & Output
 
 
 08/08 1600 08/08 0800 08/08 0000
 
Intake Total  10 250
 
Output Total   
 
Balance  10 250
 
    
 
Intake, IV  10 10
 
Intake, Oral  0 240
 
Patient 103 lb  
 
Weight   
 
 
 Laboratory Tests
 
 
 08/08 08/07
 
 1246 0750
 
Hematology  
 
  CBC w Diff  MAN DIFF ORDERED
 
  WBC (4.8 - 10.8 /CUMM)  3.2  L
 
  RBC (4.20 - 5.40 /CUMM)  3.27  L
 
  Hgb (12.0 - 16.0 G/DL)  10.6  L
 
  Hct (37 - 47 %)  31.5  L
 
  MCV (81.0 - 99.0 FL)  96.3
 
  MCH (27.0 - 31.0 PG)  32.3  H
 
  MCHC (33.0 - 37.0 G/DL)  33.5
 
  RDW (11.5 - 14.5 %)  26.7  H
 
  Plt Count (130 - 400 /CUMM)  220
 
  MPV (7.4 - 10.4 FL)  8.5
 
  Gran % (42.2 - 75.2 %)  61.2
 
  Lymphocytes % (20.5 - 51.1 %)  25.8
 
  Monocytes % (1.7 - 9.3 %)  9.8  H
 
  Eosinophils % (0 - 5 %)  3.2
 
  Basophils % (0.0 - 2.0 %)  0
 
  Absolute Granulocytes (1.4 - 6.5 /CUMM)  2.0
 
  Segmented Neutrophils (42.2 - 75.2 %)  56
 
  Band Neutrophils (0.0 - 5.0 %)  6  H
 
  Absolute Lymphocytes (1.2 - 3.4 /CUMM)  0.8  L
 
  Lymphocytes (20.5 - 51.1 %)  31
 
  Monocytes (1.7 - 9.3 %)  6
 
  Absolute Monocytes (0.10 - 0.60 /CUMM)  0.3
 
  Eosinophils (0 - 5.0 %)  1
 
  Absolute Eosinophils (0.0 - 0.7 /CUMM)  0.1
 
  Absolute Basophils (0.0 - 0.2 /CUMM)  0
 
  Nucleated RBCs (0.0 - 0.0 /100WBC)  1  H
 
  Platelet Estimate (ADEQUATE)  ADEQUATE
 
  Poikilocytosis  FEW
 
  Basophilic Stippling  RARE
 
  Anisocytosis  1+
 
Miscellaneous  
 
  Leukemic Chromosome Anal Pending 
 
  Flow Cytometry Specimen Pending 
 
 
 
 
Impression/Plan
 
Impression/Plan
Impression/Plan:
General Appearance Alert, Oriented X3, Cooperative, No Acute Distress
Skin No Rashes, No Breakdown, No Significant Lesion
Skin Temp/Moisture Exam: Warm/Dry
Sepsis Skin Exam (color): Normal for Ethnicity
HEENT Atraumatic, PERRLA, EOMI, Mucous Membr. moist/pink, +conjunctival pallor
Cardiovascular Regular Rate, Normal S1, Normal S2
Lungs Clear to Auscultation, Normal Air Movement
Abdomen Normal Bowel Sounds, Soft, No Tenderness
Neurological Normal Speech, Strength at 5/5 X4 Ext, Normal Tone, Sensation 
Intact, Cranial Nerves 3-12 NL
Extremities No Clubbing, No Cyanosis, No Edema, Normal Pulses, No Tenderness/
Swelling
Vascular Normal Pulses, Pulses Symmetrical
 
Pt with Esophageal dysmotility Osteoporosis s/p boniva for more than 5 yrs now 
on vit d and calcium and wt bearing activity,  Gastroesophageal reflux disease 
without esophagitis, Hyperlipidemia, unspecified on diet control does not want 
statin, Irritable bowel syndrome with both constipation and diarrhea, Vitamin D 
deficiency, Essential hypertension, Cervical arthritis, Hyperlipidemia, 
unspecified on diet control does not want statin, Irritable bowel syndrome with 
both constipation and diarrhea, Lung nodule less than 5 mm non smoker hence prob
benign pt wishes clinical follow up, Venous insufficiency (chronic) (peripheral)
rt more than left, Hemorrhoids, Primary osteoarthritis of both hips with chronic
anemia now comes with 
Sig symptomatic anemia prob due to Bone marrow dysfunction r/o mds
Wt loss chronic with ibs features
Chronic constipation now had no bm for 6 days, hemerroids
Fatigue
Gait imbalance with peripheral neuropathy features with no sig vit def
Previous lung nodule and pelvic gonadal vein congestion, Ct abd and pelvis nil 
acute
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
 
REC
GI eval and Heme eval appretiated
Agg rx of constipation, use dulcolax supp and lactulose etc
Check echo
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
Ok to dc today if she has a bm with po docusate, senna, daily miralax, prn 
dulcolax, prn tylenol
PT eval

## 2018-08-08 NOTE — CT SCAN REPORT
PROCEDURE:
CT GUIDED BONE MARROW ASPIRATE AND BONE MARROW BIOPSY
 
CLINICAL INFORMATION:
83-year-old patient with a medical history of myelodysplastic syndrome, now
presenting with anemia and leukopenia. Bone marrow aspirate and core bone
biopsy requested for further evaluation including exclusion of malignant
conversion.
 
COMPARISON:
CT of the abdomen and pelvis dated 08/06/2018.
 
:
Barak Cortez M.D.
 
DESCRIPTION:
The procedure was requested by Garry Mckeon M.D. Informed consent was obtained
from the patient through a Italian-Creole  prior to the
procedure. During this process, which occurred via telephone, the procedure
and potential alternatives was explained, along with the intended outcome and
benefits. The risks of the procedure, as well as the risk of not doing the
procedure, were discussed. The patient was given the opportunity to ask
questions regarding the procedure and appeared competent to make medical
decisions. A signed consent form which documents this discussion was placed
in the medical record.
 
The patient was brought to the CT suite and a final timeout procedure was
performed. Moderate sedation was induced under my direction and supervision
using Versed and fentanyl. The patient was continuously monitored by a
independent, registered nurse. At no time was there evidence of instability.
 
The patient was placed in the CT gantry in the prone position.  sections
were obtained through the pelvis with a grid on the lower back for
localization of the left posterior iliac crest.
 
The overlying soft tissues were sterilely prepped and draped. Maximum sterile
barrier technique was maintained throughout the procedure. Following
administration of superficial and deep anesthesia down to the periosteum
using 1% lidocaine and 0.5% bupivacaine, an 11-gauge, 10 cm length access
needle was introduced to the left posterior iliac tuberosity under CT
fluoroscopic guidance. The Slidebean power handle was activated to drive the
access needle forward penetrating the outer cortex. Approximately 10 mL of
bone marrow was aspirated and divided between a green top tube with
heparin/sodium chloride and a purple top tube with EDTA. The 13-gauge biopsy
needle was then introduced coaxially and three 1 cm core bone biopsy was
obtained. One core was sent in RPMI solution and 2 cores were submitted to
pathology in buffered formalin. The specimens were submitted to the
laboratory for flow cytometry, cytogenetics, and histopathology.
 
The patient tolerated the procedure well. Scans after the biopsy did not show
evidence of a hemorrhage or other complication. The patient was observed in
the recovery area and subsequently transferred to her hospital room in stable
condition.
 
ESTIMATED RADIATION EXPOSURE: 132.5 mGy-cm
SEDATION MEDICATIONS: 0.5 mg Versed, 25 mcg fentanyl
SEDATION TIME: 20 minutes.
 
IMPRESSION:
CT-guided bone marrow aspirate and core biopsy performed without evidence of
complications.

## 2018-08-08 NOTE — PN- HOUSESTAFF
Subjective
Follow-up For:
Anemia, bicytopenia's,
Complaints: no complaints
Subjective:
Patient seen and examined at bedside.  He was in no acute distress.  There was 
no events overnight.  She is NPO for expected bone marrow biopsy today.  The 
family agreed for this procedure.  She denies weakness, dizziness, headache, 
chest pain, palpitation, shortness of breath, abdominal pain, diarrhea, burning 
micturition.
 
Review of Systems
Constitutional:
Reports: see HPI. 
 
Objective
Last 24 Hrs of Vital Signs/I&O
 Vital Signs
 
 
Date Time Temp Pulse Resp B/P B/P Pulse O2 O2 Flow FiO2
 
     Mean Ox Delivery Rate 
 
620 98.1 59 16 146/68  96 Room Air  
 
 2217 98.4 61 18 120/62  97   
 
 1400 98.3 69 20 132/70  98 Room Air  
 
 
 Intake & Output
 
 
  1600  0800  0000
 
Intake Total  10 250
 
Output Total   
 
Balance  10 250
 
    
 
Intake, IV  10 10
 
Intake, Oral  0 240
 
Patient 103 lb  
 
Weight   
 
 
 
 
Physical Exam
General Appearance: Oriented X3, Cooperative, No Acute Distress
 
Assessment/Plan
Assessment:
Assessment/Plan/Problems list;
 
83-year-old lady with past medical history of esophageal dysmotility, 
osteoporosis status post Boniva for more than 5 years, GERD, hyperlipidemia, 
irritable bowel syndrome with both constipation and diarrhea, cervical arthritis
, vitamin D deficiency, essential hypertension, 5 mm lung nodules, external 
hemorrhoid, primary osteoarthritis of both hips, anemia of chronic disease 
presented to the emergency department with complaint of weakness and tiredness. 
On examination she was pallor, [place] and [place] nodules on his both hands 
fingers.  Her labs are significant for low H&H at the time of presentation at 
was 6.8/20.4 up to 2 blood transfusion his Hb is 8.8, decreased WBC count since 
, INR is normal, saturation is normal on 2 L of oxygen, B12 and folate level
is normal .vitally stable.
 
Problems list:
-Anemia/bicytopenia
-Decreased reticulocyte count
-External hemorrhoids
-Peripheral neuropathy irritable,
-bowel syndrome with constipation
-Hemotological malignancy?  Workup is awaited.
 
Plan:
 
Anemia/pancytopenia's:
 
-She currently has normochromic anemia.  But patient is taking B12 and folate 
and she also having peripheral neuropathy.  Her Hb level is 10.6 and WBC is 3.2.
This anemia may be due to chronic disease because she having many comorbidities
-Hematological malignancy; 
                           -Because the patient had a bicytopenia and his 
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  We can consider one of the differential 
myelodysplastic syndrome beacuase her her leukopenia is since long.
-Hematologist recommended bone marrow biopsy, flow cytometry/cytogenetic study 
for mutation.  He also a added procedure can be done as outpatient as well.
-Patient is be NPO from midnight for anticipated bone marrow biopsy today.
 
GI bleed:
colonoscopy 5 year back were normal.  There was no focus of bleed.
-GI consult placed
-On on rectal exam there was no blood and there was no stool, there was external
hemorrhoid but there was no focus of active bleed.
Occult abdominal and pelvic malignancy:
-CT abdomen and pelvis IVC for evaluation of any malignancy was ordered was 
negative for any mass, or any another abnormality which can lead to bicytopenias
-GI/DVT prophylaxis
-Advance diet after biopsy
-Patient conseled.
-Anticipated discharge today.
-Patient should follow up with Haematologist
-Follow up with PCP. 
Problem List:
 1. Dizziness
 
 2. Anemia
 
 3. Symptomatic anemia
 
 4. Cytopenia
 
Pain Ratin
Pain Location:
N/A
Pain Goal: Remain pain free
Pain Plan:
N/A
Tomorrow's Labs & Rationales:
CBC

## 2018-08-08 NOTE — PATIENT DISCHARGE INSTRUCTIONS
Discharge Instructions
 
General Discharge Information
You were seen/treated for:
Anemia
You had these procedures:
Bone marrow biopsy
Special Instructions:
Please seek medical attention if your develop chest pain or dizziness/or 
shortness of breath
Please follow up with your primary care within 1 week
Please follow up Dr Aguilera (Hematology) regarding your bone marrow biopsy 
results and workup for your anemia
 
Acute Coronary Syndrome
 
Inclusion Criteria
At DC or during hospital stay patient has or had the following:
ACS DIAGNOSIS No
 
Discharge Core Measures
Meds if any: Prescribed or Continued at Discharge
Meds if any: NOT Prescribed or Continued at Discharge
 
Congestive Heart Failure
 
Inclusion Criteria
At DC or during hospital stay patient has or had the following:
CHF DIAGNOSIS No
 
Discharge Core Measures
Meds if any: Prescribed or Continued at Discharge
Meds if any: NOT Prescribed or Continued at Discharge
 
Cerebrovascular accident
 
Inclusion Criteria
At DC or during hospital stay patient has or had the following:
CVA/TIA Diagnosis No
 
Discharge Core Measures
Meds if any: Prescribed or Continued at Discharge
Meds if any: NOT Prescribed or Continued at Discharge
 
Venous thromboembolism
 
Inclusion Criteria
VTE Diagnosis No
VTE Type NONE
VTE Confirmed by (Test) NONE
 
Discharge Core Measures
- Per Current guidelines, there needs to be overlap
- treatment for the first 5 days of Warfarin therapy.
- If discharged on Warfarin prior to 5 days of
- overlap therapy, the patient will need to be
- assessed for post discharge needs including
- *Post discharge parental anticoagulation
- *Warfarin and/or parental anticoagulation education
- *Follow up date to check INR post discharge
At least 5 days overlap therapy as Inpatient No
Meds if any: Prescribed or Continued at Discharge
Note: Overlap Therapy is Warfarin and Anticoagulant
Meds if any: NOT Prescribed or Continued at Discharge

## 2018-08-08 NOTE — PN- HEMATOLOGY
Subjective
Subjective:
Her daughter is acting as her .  She is feeling okay.  She denies any
new symptoms.
 
Review of Systems
Constitutional:
Denies: chills, fever. 
Cardiovascular:
Denies: chest pain. 
Gastrointestinal:
Denies: abdominal pain. 
Skin:
Denies: rash. 
Neurological/Psychological:
Denies: confusion. 
Hematologic/Endocrine:
Denies: bruising, bleeding. 
All Other Systems: Reviewed and Negative
 
Objective
Vital Signs and I&Os
Vital Signs
 
 
Date Time Temp Pulse Resp B/P B/P Pulse O2 O2 Flow FiO2
 
     Mean Ox Delivery Rate 
 
08/08 0620 98.1 59 16 146/68  96 Room Air  
 
08/07 2217 98.4 61 18 120/62  97   
 
08/07 1400 98.3 69 20 132/70  98 Room Air  
 
 
 Intake & Output
 
 
 08/08 0800 08/08 0000 08/07 1600 08/07 0800 08/07 0000 08/06 1600
 
Intake Total 10 250 400 360 600 560
 
Output Total      1200
 
Balance 10 250 400 360 600 -640
 
       
 
Intake, IV 10 10    10
 
Intake, Oral 0 240 400 360 600 550
 
Number      0
 
Bowel      
 
Movements      
 
Output, Urine      1200
 
 
 
Physical Exam:
General Appearance: well developed/nourished, no apparent distress, alert, awake
, comfortable, thin
Head: atraumatic, normal appearance
Eyes: Bilateral: PERRL, EOMI. 
Respiratory: normal breath sounds, chest non-tender, no respiratory distress, 
quiet respiration
Cardiovascular: regular rate/rhythm
Gastrointestinal: normal bowel sounds, soft, non-tender, no organomegaly
Extremities: no edema
Neurologic/Psych: awake, alert, oriented x 3
Skin: intact, normal color, warm/dry
Current Medications:
 Current Medications
 
 
  Sig/Erin Start time  Last
 
Medication Dose Route Stop Time Status Admin
 
Acetaminophen 650 MG Q6P PRN 08/05 1930 AC 
 
  PO   
 
Acetaminophen 700 MG Q6P PRN 08/05 1930 AC 
 
  IV   
 
Polyethylene Glycol 17 GM DAILY 08/06 1208 AC 08/07
 
  PO   0830
 
 
 
 
Results
Last 24 Hours of Lab Results:
 Laboratory Tests
 
 
 08/07 0750
 
Hematology 
 
  CBC w Diff MAN DIFF ORDERED
 
  WBC (4.8 - 10.8 /CUMM) 3.2  L
 
  RBC (4.20 - 5.40 /CUMM) 3.27  L
 
  Hgb (12.0 - 16.0 G/DL) 10.6  L
 
  Hct (37 - 47 %) 31.5  L
 
  MCV (81.0 - 99.0 FL) 96.3
 
  MCH (27.0 - 31.0 PG) 32.3  H
 
  MCHC (33.0 - 37.0 G/DL) 33.5
 
  RDW (11.5 - 14.5 %) 26.7  H
 
  Plt Count (130 - 400 /CUMM) 220
 
  MPV (7.4 - 10.4 FL) 8.5
 
  Gran % (42.2 - 75.2 %) 61.2
 
  Lymphocytes % (20.5 - 51.1 %) 25.8
 
  Monocytes % (1.7 - 9.3 %) 9.8  H
 
  Eosinophils % (0 - 5 %) 3.2
 
  Basophils % (0.0 - 2.0 %) 0
 
  Absolute Granulocytes (1.4 - 6.5 /CUMM) 2.0
 
  Segmented Neutrophils (42.2 - 75.2 %) 56
 
  Band Neutrophils (0.0 - 5.0 %) 6  H
 
  Absolute Lymphocytes (1.2 - 3.4 /CUMM) 0.8  L
 
  Lymphocytes (20.5 - 51.1 %) 31
 
  Monocytes (1.7 - 9.3 %) 6
 
  Absolute Monocytes (0.10 - 0.60 /CUMM) 0.3
 
  Eosinophils (0 - 5.0 %) 1
 
  Absolute Eosinophils (0.0 - 0.7 /CUMM) 0.1
 
  Absolute Basophils (0.0 - 0.2 /CUMM) 0
 
  Nucleated RBCs (0.0 - 0.0 /100WBC) 1  H
 
  Platelet Estimate (ADEQUATE) ADEQUATE
 
  Poikilocytosis FEW
 
  Basophilic Stippling RARE
 
  Anisocytosis 1+
 
 
 
 
Assessment/Plan Hematology
Assessment/Recommendations:
Ms. Ramirez is an 83-year-old female with osteoporosis, GERD, and anemia who 
presented to the hospital with anemia.  She was seen by her primary care 
physician and was noted to have severe anemia and was sent to the emergency 
department.  The patient does not speak English and her daughter acts as the 
.
 
On presentation, she was leukopenic and anemic.  She responded well to 2 units 
of pRBC transfusion. Hemoglobin is 10.6 yesterday.  WBC is 3,200 yesterday. Iron
study was normal.  Vitamin B12 and folate level were normal.  She is on B12 and 
folate supplementsHer reticulocyte count is normal but low relative to her 
anemia.  She has no kidney dysfunction.  She has no liver dysfunction.  
Bilirubin was normal. HIV and hepatitis were negative. She has intermittent 
leukopenia since at least 2011. She has anemia since at least 2010. 
She likely has an underlying marrow infiltrative process.  She will need bone 
marrow biopsy.  This may be done inpatient or outpatient. Differential includes 
MDS, nutritional deficiency, infectious, and other marrow infiltrative process. 
She will need follow up as outpatient.
 
Cytopenia:
-follow up flow cytometry
-bone marrow biopsy: if done inpatient, send for cytogenetics in addition to 
normal evaluation for MDS; may be done as outpatient
-transfused with packed RBC if hemoglobin less than 8
-follow up as outpatient
 
Please call [phone] with any questions or concerns.
Problem List:
 1. Cytopenia
 
 2. Symptomatic anemia

## 2018-08-16 NOTE — DISCHARGE SUMMARY
Visit Information
 
Visit Dates
Admission Date:
08/05/18
 
Discharge Date:
08/08/18
 
 
Hospital Course
 
Course
Attending Physician:
Zak Clayton MD
 
Primary Care Physician:
Zak Clayton MD
 
Hospital Course:
Pt with Esophageal dysmotility Osteoporosis s/p boniva for more than 5 yrs now 
on vit d and calcium and wt bearing activity,  Gastroesophageal reflux disease 
without esophagitis, Hyperlipidemia, unspecified on diet control does not want 
statin, Irritable bowel syndrome with both constipation and diarrhea, Vitamin D 
deficiency, Essential hypertension, Cervical arthritis, Hyperlipidemia, 
unspecified on diet control does not want statin, Irritable bowel syndrome with 
both constipation and diarrhea, Lung nodule less than 5 mm non smoker hence prob
benign pt wishes clinical follow up, Venous insufficiency (chronic) (peripheral)
rt more than left, Hemorrhoids, Primary osteoarthritis of both hips with chronic
anemia now comes with 
 
Sig symptomatic anemia prob due to Bone marrow dysfunction from myelodysplasia
Wt loss chronic with ibs features
Chronic constipation  hemerrhoids
Fatigue
Gait imbalance with peripheral neuropathy features with no sig vit D def
Previous lung nodule and pelvic gonadal vein congestion, Ct abd and pelvis nil 
acute
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
 
BONE MARROW, BIOPSY AND ASPIRATION:
     HYPERCELLULAR MARROW SHOWING INCREASED RING SIDEROBLASTS, MOST SUGGESTIVE
     OF MYELODYSPLASTIC SYNDROME WITH RING SIDEROBLASTS.  SEE NOTE.
      
     This diagnosis was given to Dr. Aguilera on 8/14/18 at 2:15 pm.
      
     NOTE:
     Non-neoplastic causes of ring sideroblasts, including alcohol, toxins,
     drugs, zinc administration, copper deficiency and congenital sideroblastic
     anemia, must be excluded.  Recommend correlation with cytogenetics and
     molecular studies (particularly SF3B1).
      
     CBC (8/3/18), by report:
     HGC: 6.6 g/dL, MCV: 103.5 fL, WBC: 1.9 K/uL, PLT:244 K/uL
      
     BONE MARROW BIOPSY:
     Fragmented, mild hypercellular marrow for age (30-40% cellular).
     Megakaryocytes are present in decreased number with scattered small forms.
     The myeloid:erythroid (M:E) ratio is decreased.
     Erythroid elements exhibit maturation with left shift.
     Myeloid elements exhibit maturation.
     Lymphoid aggregates are not seen.
     Granulomas are not seen.
     Trabecular bone is unremarkable.
     Iron stain reveals no stainable iron.
      
     IMMUNOSTAINS:
     CD34 positive blasts are not increased (less than 5% of cellularity). CD71
     and MPO confirms the M:E radion of approximately 1:1.
      
     BONE MARROW ASPIRATE:
     The aspirate smear is markedly hypocellular, aspicular and hemodilute.  A
     representative cell count could not be performed due to marked
     hypocellularity.  Erythroid elements show dysplasia.  Prussian blue iron
     stain on dilute smear is inconclusive for storage iron assessment;
     however, most nucleated RBCs present are ring sideroblasts (>50%).
      
     FLOW CYTOMETRY:
     By report, flow cytometic analysis reveals predominantly right shifted
     marrow elements suggestive of peripheral blood contamination.  Normal
     number of phenotype myeloblasts with normal myeloid scatter by CD45/SSC. 
     There is a normal CD10/CD13/CD16/CD11b myeloid maturation pattern and all
     other myeloid markers are normally expressed, hence there is no
     immunophenotypic evidence of myelodysplasia.  No evidence for monoclonal
     B-cell lymphoproliferative disease.  Although no unusual phenotype T cells
     are identified, surface markers will not routinely detect monoclonal T
     lymphocytes.
      
     This case was reviewed by Dr. Ignacio Das of [facility], [address] [place] [address].
 
Hospital course
Pt did well after transfusion
Was treated for consitpation and stable upon dc
Allergies:
Coded Allergies:
NO KNOWN ALLERGIES (10/04/12)
  NKA PER ANTIBIOTIC ORDER SHEET
 
 
Disposition Summary
 
Disposition
Principal Diagnosis:
Sig symptomatic anemia due to Bone marrow dysfunction from MYELODYSPLASTIC 
SYNDROME
Additional Diagnosis:
Wt loss chronic with ibs features
Chronic constipation 
Hemerrhoids
Fatigue
Gait imbalance with peripheral neuropathy features with no sig vit D def
Previous lung nodule and pelvic gonadal vein congestion, Ct abd and pelvis nil 
acute
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
 
Discharge Disposition: home or self care
 
Discharge Instructions
 
General Discharge Information
Code Status: Full Code
Patient's Diet:
as lucie
Patient's Activity:
as lucie
Follow-Up Instructions/Appts:
Myself and Dr. Hussein
 
Medications at Discharge
Discharge Medications:
Continue taking these medications:
Losartan Potassium (Losartan Potassium) 50 MG TABLET
    1 Tablet ORAL DAILY
    Qty = 90
    Comments:
       NOT GIVEN IN HOSPITAL
 
Docusate Sodium (Docusate Sodium) 100 MG CAPSULE
    1 Capsule ORAL DAILY
    Comments:
       NOT GIVEN IN HOSPITAL
 
Sennosides (Senokot) 8.6 MG TABLET
    1 Tablet ORAL DAILY
    Comments:
       NOT GIVEN IN HOSPITAL
 
Aspirin (Ecotrin*) 325 MG TABLET.DR
    1 Tablet ORAL DAILY
    Comments:
       NOT GIVEN IN HOSPITAL
 
Polyethylene Glycol 3350 (Polyethylene Glycol 3350) 17 GRAM/DOSE POWDER
    17 Gram ORAL  as needed for GI
    Qty = 3162
    Comments:
       Last Taken: 8/8/18
             Time: 2:00 PM
 
Ferrous Sulfate (IRON) 325 MG (65 MG IRON) TABLET
    1 Tablet ORAL Every other day
    Comments:
       NOT GIVEN IN HOSPITAL
 
Acetaminophen (Tylenol) (Unknown Strength) TABLET
    650 ORAL EVERY SIX HOURS as needed for PAIN
    Qty = 30
    Comments:
       NOT GIVEN IN HOSPITAL
 
 
Copies To:
Ale LEMUS,Keenan
 
Attending MD Review Statement
Documenting Attending:
Forrest LEMUS,Zak CENTENO